# Patient Record
Sex: MALE | Race: WHITE | NOT HISPANIC OR LATINO | Employment: OTHER | URBAN - NONMETROPOLITAN AREA
[De-identification: names, ages, dates, MRNs, and addresses within clinical notes are randomized per-mention and may not be internally consistent; named-entity substitution may affect disease eponyms.]

---

## 2018-08-13 ENCOUNTER — OFFICE VISIT (OUTPATIENT)
Dept: UROLOGY | Facility: CLINIC | Age: 53
End: 2018-08-13

## 2018-08-13 VITALS
SYSTOLIC BLOOD PRESSURE: 132 MMHG | DIASTOLIC BLOOD PRESSURE: 62 MMHG | HEART RATE: 82 BPM | OXYGEN SATURATION: 98 % | RESPIRATION RATE: 16 BRPM

## 2018-08-13 DIAGNOSIS — N40.0 BENIGN PROSTATIC HYPERPLASIA WITHOUT LOWER URINARY TRACT SYMPTOMS: ICD-10-CM

## 2018-08-13 DIAGNOSIS — N43.40 SPERMATOCELE: Primary | ICD-10-CM

## 2018-08-13 DIAGNOSIS — N43.3 HYDROCELE IN ADULT: ICD-10-CM

## 2018-08-13 LAB
BILIRUB BLD-MCNC: NEGATIVE MG/DL
CLARITY, POC: CLEAR
COLOR UR: YELLOW
GLUCOSE UR STRIP-MCNC: NEGATIVE MG/DL
KETONES UR QL: NEGATIVE
LEUKOCYTE EST, POC: NEGATIVE
NITRITE UR-MCNC: NEGATIVE MG/ML
PH UR: 7.5 [PH] (ref 5–8)
PROT UR STRIP-MCNC: NEGATIVE MG/DL
PSA SERPL-MCNC: 2.07 NG/ML (ref 0.06–4)
RBC # UR STRIP: NEGATIVE /UL
SP GR UR: 1.01 (ref 1–1.03)
UROBILINOGEN UR QL: NORMAL

## 2018-08-13 PROCEDURE — 99203 OFFICE O/P NEW LOW 30 MIN: CPT | Performed by: UROLOGY

## 2018-08-13 PROCEDURE — 81003 URINALYSIS AUTO W/O SCOPE: CPT | Performed by: UROLOGY

## 2018-08-13 PROCEDURE — 55000 DRAINAGE OF HYDROCELE: CPT | Performed by: UROLOGY

## 2018-08-13 NOTE — PROGRESS NOTES
Chief Complaint  hydrocele/spermatocele? (follow up for exam, patient has not been seen for 3 years.)        MARISOL Mccall is a 53 y.o. male who returns today for the first time in several years with a past history of right-sided scrotal mass.  This is been periodically aspirated with production of white cloudy fluid consistent with spermatocele.  He continues to deny any difficulty voiding.  I found some old records where someone had put in family history of prostate cancer.  He denies that today for both breast and prostate.  He has any symptoms of sexual dysfunction or low testosterone level.  He has not had his prostate checked recently nor PSA.    Vitals:    08/13/18 0923   BP: 132/62   Pulse: 82   Resp: 16   SpO2: 98%       Past Medical History  Past Medical History:   Diagnosis Date   • GERD (gastroesophageal reflux disease)        Past Surgical History  History reviewed. No pertinent surgical history.    Medications  currently has no medications in their medication list.      Allergies  No Known Allergies    Social History  Social History     Social History Narrative   • No narrative on file       Family History  He has no family history of bladder or kidney cancer  He has no family history of kidney stones      AUA Symptom Score:      Review of Systems  Review of Systems   Constitutional: Negative.    Genitourinary: Negative.    All other systems reviewed and are negative.      Physical Exam  Physical Exam   Constitutional: He is oriented to person, place, and time. He appears well-developed and well-nourished.   HENT:   Head: Normocephalic and atraumatic.   Neck: Normal range of motion.   Pulmonary/Chest: Effort normal. No respiratory distress.   Abdominal: Soft. He exhibits no distension and no mass. There is no tenderness. No hernia. Hernia confirmed negative in the right inguinal area and confirmed negative in the left inguinal area.   Genitourinary: Rectum normal, prostate normal and penis normal.  Right testis shows mass.         Musculoskeletal: Normal range of motion.   Lymphadenopathy:     He has no cervical adenopathy.   Neurological: He is alert and oriented to person, place, and time.   Skin: Skin is warm and dry.   Psychiatric: He has a normal mood and affect. His behavior is normal.   Vitals reviewed.      Labs Recent and today in the office:  Results for orders placed or performed in visit on 08/13/18   POC Urinalysis Dipstick, Automated   Result Value Ref Range    Color Yellow Yellow, Straw, Dark Yellow, Marly    Clarity, UA Clear Clear    Specific Gravity  1.015 1.005 - 1.030    pH, Urine 7.5 5.0 - 8.0    Leukocytes Negative Negative    Nitrite, UA Negative Negative    Protein, POC Negative Negative mg/dL    Glucose, UA Negative Negative, 1000 mg/dL (3+) mg/dL    Ketones, UA Negative Negative    Urobilinogen, UA Normal Normal    Bilirubin Negative Negative    Blood, UA Negative Negative     The patient is placed in a supine position prepped and draped in a routine sterile fashion after shaving the scrotum and transilluminating the mass with fiberoptic light.  This identifies the position of the testicle within the hydrocele.  The skin and tunica sac were infiltrated with 1% plain Xylocaine and then an Angiocath needle is inserted through the anesthetized area into the hydrocele sac.  150  ml was aspirated from the mass and the testicle examined.  Antibiotic ointment was applied to the puncture site followed by a sterile compressive dressing.    Assessment & Plan  #1 nonnodular BPH digital rectal exam today is benign and a PSA is obtained.  If normal he can return on an annual basis.    #2 spermatocele: He understands the likelihood of recurrence but this is actually much smaller than it has been in the past.  He will therefore return on an annual basis and when necessary.

## 2019-01-25 ENCOUNTER — OFFICE VISIT (OUTPATIENT)
Dept: UROLOGY | Facility: CLINIC | Age: 54
End: 2019-01-25

## 2019-01-25 VITALS
SYSTOLIC BLOOD PRESSURE: 128 MMHG | TEMPERATURE: 98.2 F | DIASTOLIC BLOOD PRESSURE: 80 MMHG | OXYGEN SATURATION: 99 % | HEART RATE: 81 BPM

## 2019-01-25 DIAGNOSIS — N40.0 BENIGN PROSTATIC HYPERPLASIA, UNSPECIFIED WHETHER LOWER URINARY TRACT SYMPTOMS PRESENT: Primary | ICD-10-CM

## 2019-01-25 DIAGNOSIS — N43.40 SPERMATOCELE: ICD-10-CM

## 2019-01-25 LAB
BILIRUB BLD-MCNC: NEGATIVE MG/DL
CLARITY, POC: CLEAR
COLOR UR: YELLOW
GLUCOSE UR STRIP-MCNC: NEGATIVE MG/DL
KETONES UR QL: NEGATIVE
LEUKOCYTE EST, POC: NEGATIVE
NITRITE UR-MCNC: NEGATIVE MG/ML
PH UR: 8 [PH] (ref 5–8)
PROT UR STRIP-MCNC: NEGATIVE MG/DL
RBC # UR STRIP: NEGATIVE /UL
SP GR UR: 1.01 (ref 1–1.03)
UROBILINOGEN UR QL: NORMAL

## 2019-01-25 PROCEDURE — 55000 DRAINAGE OF HYDROCELE: CPT | Performed by: UROLOGY

## 2019-01-25 PROCEDURE — 81003 URINALYSIS AUTO W/O SCOPE: CPT | Performed by: UROLOGY

## 2019-01-25 NOTE — PROGRESS NOTES
Chief Complaint  Non Nodular Bph and Spermatocele        HPI  Cal is a 54 y.o. male who returns today requesting aspiration of his scrotal mass.  He states is not quite as big as last time but he is changing insurance companies and would like to proceed at this time.  He is informed there is a 1-5% chance of bleeding and infection each time we aspirate this spermatocele.  If he had surgical excision it would be more definite but still with a slight chance of recurrence unless he has an orchiectomy.  He is not interested in surgery    Vitals:    01/25/19 0837   BP: 128/80   Pulse: 81   Temp: 98.2 °F (36.8 °C)   SpO2: 99%       Past Medical History  Past Medical History:   Diagnosis Date   • GERD (gastroesophageal reflux disease)        Past Surgical History  No past surgical history on file.    Medications  currently has no medications in their medication list.      Allergies  No Known Allergies    Social History  Social History     Social History Narrative   • Not on file       Family History  He has no family history of bladder or kidney cancer  He has no family history of kidney stones      AUA Symptom Score:      Review of Systems  Review of Systems    Physical Exam  Physical Exam    Labs Recent and today in the office:  Results for orders placed or performed in visit on 01/25/19   POC Urinalysis Dipstick, Automated   Result Value Ref Range    Color Yellow Yellow, Straw, Dark Yellow, Marly    Clarity, UA Clear Clear    Specific Gravity  1.015 1.005 - 1.030    pH, Urine 8.0 5.0 - 8.0    Leukocytes Negative Negative    Nitrite, UA Negative Negative    Protein, POC Negative Negative mg/dL    Glucose, UA Negative Negative, 1000 mg/dL (3+) mg/dL    Ketones, UA Negative Negative    Urobilinogen, UA Normal Normal    Bilirubin Negative Negative    Blood, UA Negative Negative     The patient is placed in a supine position prepped and draped in a routine sterile fashion after shaving the scrotum and transilluminating  the mass with fiberoptic light.  This identifies the position of the testicle within the hydrocele.  The skin and tunica sac were infiltrated with 1% plain Xylocaine and then an Angiocath needle is inserted through our aspirated 160 mL of white: the anesthetized area into the spermatocele sac.  160 ml was aspirated from the mass and the testicle examined.  Antibiotic ointment was applied to the puncture site followed by a sterile compressive dressing.    Assessment & Plan  #1 spermatocele: Fluid aspirated 160 mL of

## 2019-08-15 ENCOUNTER — OFFICE VISIT (OUTPATIENT)
Dept: UROLOGY | Facility: CLINIC | Age: 54
End: 2019-08-15

## 2019-08-15 DIAGNOSIS — N40.0 BENIGN PROSTATIC HYPERPLASIA, UNSPECIFIED WHETHER LOWER URINARY TRACT SYMPTOMS PRESENT: Primary | ICD-10-CM

## 2019-08-15 DIAGNOSIS — N43.40 SPERMATOCELE: ICD-10-CM

## 2019-08-15 LAB
BILIRUB BLD-MCNC: NEGATIVE MG/DL
CLARITY, POC: CLEAR
COLOR UR: YELLOW
GLUCOSE UR STRIP-MCNC: NEGATIVE MG/DL
KETONES UR QL: NEGATIVE
LEUKOCYTE EST, POC: NEGATIVE
NITRITE UR-MCNC: NEGATIVE MG/ML
PH UR: 6.5 [PH] (ref 5–8)
PROT UR STRIP-MCNC: NEGATIVE MG/DL
RBC # UR STRIP: NEGATIVE /UL
SP GR UR: 1.01 (ref 1–1.03)
UROBILINOGEN UR QL: NORMAL

## 2019-08-15 PROCEDURE — 81003 URINALYSIS AUTO W/O SCOPE: CPT | Performed by: UROLOGY

## 2019-08-15 PROCEDURE — 55000 DRAINAGE OF HYDROCELE: CPT | Performed by: UROLOGY

## 2019-08-15 NOTE — PROGRESS NOTES
Chief Complaint  Benign Prostatic Hypertrophy and Spermatocele        HPI  Cal is a 54 y.o. male who returns today for his annual checkup with a known scrotal swelling secondary to spermatocele having benefited from several aspirations and requesting another today.  He reminds me his digital rectal exam was benign on previous visit and he declines today.  It has been a year since he had a PSA done.    There were no vitals filed for this visit.    Past Medical History  Past Medical History:   Diagnosis Date   • GERD (gastroesophageal reflux disease)        Past Surgical History  No past surgical history on file.    Medications  currently has no medications in their medication list.      Allergies  No Known Allergies    Social History  Social History     Social History Narrative   • Not on file       Family History  He has no family history of bladder or kidney cancer  He has no family history of kidney stones      AUA Symptom Score:      Review of Systems  Review of Systems   Constitutional: Negative for activity change, appetite change, chills, fatigue, fever, unexpected weight gain and unexpected weight loss.   Respiratory: Negative for apnea, cough, chest tightness, shortness of breath, wheezing and stridor.    Cardiovascular: Negative for chest pain, palpitations and leg swelling.   Gastrointestinal: Negative for abdominal distention, abdominal pain, anal bleeding, blood in stool, constipation, diarrhea, nausea, rectal pain, vomiting, GERD and indigestion.   Genitourinary: Negative for decreased libido, decreased urine volume, difficulty urinating, discharge, dysuria, flank pain, frequency, genital sores, hematuria, nocturia, penile pain, erectile dysfunction, penile swelling, scrotal swelling, testicular pain, urgency and urinary incontinence.   Musculoskeletal: Negative for back pain and joint swelling.   Neurological: Negative for tremors, seizures, speech difficulty, weakness and numbness.    Psychiatric/Behavioral: Negative for agitation, decreased concentration, sleep disturbance, depressed mood and stress. The patient is not nervous/anxious.        Physical Exam  Physical Exam   Constitutional: He is oriented to person, place, and time. He appears well-developed and well-nourished.   HENT:   Head: Normocephalic and atraumatic.   Neck: Normal range of motion.   Pulmonary/Chest: Effort normal. No respiratory distress.   Abdominal: Soft. He exhibits no distension and no mass. There is no tenderness. No hernia.   Genitourinary:         Musculoskeletal: Normal range of motion.   Lymphadenopathy:     He has no cervical adenopathy.   Neurological: He is alert and oriented to person, place, and time.   Skin: Skin is warm and dry.   Psychiatric: He has a normal mood and affect. His behavior is normal.   Vitals reviewed.      Labs Recent and today in the office:  Results for orders placed or performed in visit on 08/15/19   POC Urinalysis Dipstick, Automated   Result Value Ref Range    Color Yellow Yellow, Straw, Dark Yellow, Marly    Clarity, UA Clear Clear    Specific Gravity  1.015 1.005 - 1.030    pH, Urine 6.5 5.0 - 8.0    Leukocytes Negative Negative    Nitrite, UA Negative Negative    Protein, POC Negative Negative mg/dL    Glucose, UA Negative Negative, 1000 mg/dL (3+) mg/dL    Ketones, UA Negative Negative    Urobilinogen, UA Normal Normal    Bilirubin Negative Negative    Blood, UA Negative Negative     The patient is placed in a supine position prepped and draped in a routine sterile fashion after shaving the scrotum and transilluminating the mass with fiberoptic light.  This identifies the position of the testicle within the hydrocele.  The skin and tunica sac were infiltrated with 1% plain Xylocaine and then an Angiocath needle is inserted through the anesthetized area into the hydrocele sac.  150 ml was aspirated from the mass and the testicle examined.  Antibiotic ointment was applied to the  puncture site followed by a sterile compressive dressing.    Assessment & Plan  Scrotal mass: Known previous spermatocele aspirated today without difficulty    BPH: Currently voiding without difficulty.  Previous digital rectal exam is benign and a PSA today is submitted.  He is encouraged to eat a heart healthy diet and return on an annual basis.

## 2019-08-16 LAB — PSA SERPL-MCNC: 1.88 NG/ML (ref 0–4)

## 2020-07-06 ENCOUNTER — OFFICE VISIT (OUTPATIENT)
Dept: UROLOGY | Facility: CLINIC | Age: 55
End: 2020-07-06

## 2020-07-06 DIAGNOSIS — N43.40 SPERMATOCELE: Primary | ICD-10-CM

## 2020-07-06 PROCEDURE — 55000 DRAINAGE OF HYDROCELE: CPT | Performed by: UROLOGY

## 2020-07-06 NOTE — PROGRESS NOTES
Chief Complaint  Hydrocele Aspiration        HPI  Cal is a 55 y.o. male who returns today for annual visit basically requesting aspiration of his scrotal mass which is a scrotal hydrocele or spermatocele.  He remains asymptomatic in regards to his prostate and had a PSA 1.810 months ago.    There were no vitals filed for this visit.    Past Medical History  Past Medical History:   Diagnosis Date   • GERD (gastroesophageal reflux disease)        Past Surgical History  No past surgical history on file.    Medications  currently has no medications in their medication list.      Allergies  No Known Allergies    Social History  Social History     Socioeconomic History   • Marital status:      Spouse name: Not on file   • Number of children: Not on file   • Years of education: Not on file   • Highest education level: Not on file   Tobacco Use   • Smoking status: Never Smoker   • Smokeless tobacco: Never Used   Substance and Sexual Activity   • Alcohol use: Yes   • Drug use: No   • Sexual activity: Yes       Family History  He has no family history of bladder or kidney cancer  He has no family history of kidney stones      AUA Symptom Score:      Review of Systems  Review of Systems   Constitutional: Negative for activity change, appetite change, chills, fatigue, fever, unexpected weight gain and unexpected weight loss.   Respiratory: Negative for apnea, cough, chest tightness, shortness of breath, wheezing and stridor.    Cardiovascular: Negative for chest pain, palpitations and leg swelling.   Gastrointestinal: Negative for abdominal distention, abdominal pain, anal bleeding, blood in stool, constipation, diarrhea, nausea, rectal pain, vomiting, GERD and indigestion.   Genitourinary: Negative for decreased libido, decreased urine volume, difficulty urinating, discharge, dysuria, flank pain, frequency, genital sores, hematuria, nocturia, penile pain, erectile dysfunction, penile swelling, scrotal swelling,  testicular pain, urgency and urinary incontinence.   Musculoskeletal: Negative for back pain and joint swelling.   Neurological: Negative for tremors, seizures, speech difficulty, weakness and numbness.   Psychiatric/Behavioral: Negative for agitation, decreased concentration, sleep disturbance, depressed mood and stress. The patient is not nervous/anxious.        Physical Exam  Physical Exam   Constitutional: He is oriented to person, place, and time. He appears well-developed and well-nourished.   HENT:   Head: Normocephalic and atraumatic.   Neck: Normal range of motion.   Pulmonary/Chest: Effort normal. No respiratory distress.   Abdominal: Soft. He exhibits no distension and no mass. There is no tenderness. No hernia.   Genitourinary: Rectum normal and prostate normal.         Musculoskeletal: Normal range of motion.   Lymphadenopathy:     He has no cervical adenopathy.   Neurological: He is alert and oriented to person, place, and time.   Skin: Skin is warm and dry.   Psychiatric: He has a normal mood and affect. His behavior is normal.   Vitals reviewed.      Labs Recent and today in the office:  Results for orders placed or performed in visit on 08/15/19   PSA Screen   Result Value Ref Range    PSA 1.880 0.000 - 4.000 ng/mL   POC Urinalysis Dipstick, Automated   Result Value Ref Range    Color Yellow Yellow, Straw, Dark Yellow, Marly    Clarity, UA Clear Clear    Specific Gravity  1.015 1.005 - 1.030    pH, Urine 6.5 5.0 - 8.0    Leukocytes Negative Negative    Nitrite, UA Negative Negative    Protein, POC Negative Negative mg/dL    Glucose, UA Negative Negative, 1000 mg/dL (3+) mg/dL    Ketones, UA Negative Negative    Urobilinogen, UA Normal Normal    Bilirubin Negative Negative    Blood, UA Negative Negative     The patient is placed in a supine position prepped and draped in a routine sterile fashion after shaving the scrotum and transilluminating the mass with fiberoptic light.  This identifies the  position of the testicle within the hydrocele.  The skin and tunica sac were infiltrated with 1% plain Xylocaine and then an Angiocath needle is inserted through the anesthetized area into the hydrocele sac.  150 ml was aspirated from the mass and the testicle examined.  Antibiotic ointment was applied to the puncture site followed by a sterile compressive dressing.    Assessment & Plan  BPH: Digital rectal exam today is benign and a PSA this year was 1.89.    Scrotal mass/spermatocele: 150 mL of cloudy fluid consistent with spermatocele was aspirated.  Patient is informed of the option of surgical procedure which would produce a more long-lasting result.  He return in 1 year or as needed.

## 2021-04-14 ENCOUNTER — PROCEDURE VISIT (OUTPATIENT)
Dept: UROLOGY | Facility: CLINIC | Age: 56
End: 2021-04-14

## 2021-04-14 VITALS
HEART RATE: 82 BPM | RESPIRATION RATE: 16 BRPM | SYSTOLIC BLOOD PRESSURE: 134 MMHG | WEIGHT: 180 LBS | DIASTOLIC BLOOD PRESSURE: 68 MMHG | HEIGHT: 71 IN | TEMPERATURE: 97.7 F | OXYGEN SATURATION: 96 % | BODY MASS INDEX: 25.2 KG/M2

## 2021-04-14 DIAGNOSIS — N43.40 SPERMATOCELE: Primary | ICD-10-CM

## 2021-04-14 DIAGNOSIS — N40.0 BENIGN PROSTATIC HYPERPLASIA, UNSPECIFIED WHETHER LOWER URINARY TRACT SYMPTOMS PRESENT: ICD-10-CM

## 2021-04-14 DIAGNOSIS — N40.0 BENIGN PROSTATIC HYPERPLASIA, UNSPECIFIED WHETHER LOWER URINARY TRACT SYMPTOMS PRESENT: Primary | ICD-10-CM

## 2021-04-14 PROCEDURE — 55000 DRAINAGE OF HYDROCELE: CPT | Performed by: UROLOGY

## 2021-04-14 NOTE — PROGRESS NOTES
Chief Complaint  Hydrocele Aspiration        HPI  Cal is a 56 y.o. male who comes today requesting his right hydrocele be aspirated as he has on a number of occasions with good results.  Is also due for an annual screening on his prostate.  He denies any difficulty voiding and has a benign prostate on digital rectal exam today and a PSA is submitted.  He can return on an annual basis and as needed to see Dr. Dominguez for hydrocele aspiration.    Vitals:    04/14/21 1515   BP: 134/68   Pulse: 82   Resp: 16   Temp: 97.7 °F (36.5 °C)   SpO2: 96%       Past Medical History  Past Medical History:   Diagnosis Date   • GERD (gastroesophageal reflux disease)        Past Surgical History  No past surgical history on file.    Medications  currently has no medications in their medication list.      Allergies  No Known Allergies    Social History  Social History     Socioeconomic History   • Marital status:      Spouse name: Not on file   • Number of children: Not on file   • Years of education: Not on file   • Highest education level: Not on file   Tobacco Use   • Smoking status: Never Smoker   • Smokeless tobacco: Never Used   Substance and Sexual Activity   • Alcohol use: Yes   • Drug use: No   • Sexual activity: Yes       Family History  He has no family history of bladder or kidney cancer  He has no family history of kidney stones      AUA Symptom Score:      Review of Systems  Review of Systems   Constitutional: Negative for activity change, appetite change, chills, fatigue, fever, unexpected weight gain and unexpected weight loss.   Respiratory: Negative for apnea, cough, chest tightness, shortness of breath, wheezing and stridor.    Cardiovascular: Negative for chest pain, palpitations and leg swelling.   Gastrointestinal: Negative for abdominal distention, abdominal pain, anal bleeding, blood in stool, constipation, diarrhea, nausea, rectal pain, vomiting, GERD and indigestion.   Genitourinary: Negative for  decreased libido, decreased urine volume, difficulty urinating, discharge, dysuria, flank pain, frequency, genital sores, hematuria, nocturia, penile pain, erectile dysfunction, penile swelling, scrotal swelling, testicular pain, urgency and urinary incontinence.   Musculoskeletal: Negative for back pain and joint swelling.   Neurological: Negative for tremors, seizures, speech difficulty, weakness and numbness.   Psychiatric/Behavioral: Negative for agitation, decreased concentration, sleep disturbance, depressed mood and stress. The patient is not nervous/anxious.        Physical Exam  Physical Exam  Vitals reviewed.   Constitutional:       Appearance: He is well-developed.   HENT:      Head: Normocephalic and atraumatic.   Pulmonary:      Effort: Pulmonary effort is normal. No respiratory distress.   Abdominal:      General: There is no distension.      Palpations: Abdomen is soft. There is no mass.      Tenderness: There is no abdominal tenderness.      Hernia: No hernia is present.   Genitourinary:     Prostate: Normal.      Rectum: Normal.   Musculoskeletal:         General: Normal range of motion.      Cervical back: Normal range of motion.   Lymphadenopathy:      Cervical: No cervical adenopathy.   Skin:     General: Skin is warm and dry.   Neurological:      Mental Status: He is alert and oriented to person, place, and time.   Psychiatric:         Behavior: Behavior normal.         Labs Recent and today in the office:  Results for orders placed or performed in visit on 08/15/19   PSA Screen    Specimen: Blood   Result Value Ref Range    PSA 1.880 0.000 - 4.000 ng/mL   POC Urinalysis Dipstick, Automated    Specimen: Urine   Result Value Ref Range    Color Yellow Yellow, Straw, Dark Yellow, Marly    Clarity, UA Clear Clear    Specific Gravity  1.015 1.005 - 1.030    pH, Urine 6.5 5.0 - 8.0    Leukocytes Negative Negative    Nitrite, UA Negative Negative    Protein, POC Negative Negative mg/dL    Glucose, UA  Negative Negative, 1000 mg/dL (3+) mg/dL    Ketones, UA Negative Negative    Urobilinogen, UA Normal Normal    Bilirubin Negative Negative    Blood, UA Negative Negative     The patient is placed in a supine position prepped and draped in a routine sterile fashion after shaving the scrotum and transilluminating the mass with fiberoptic light.  This identifies the position of the testicle within the hydrocele.  The skin and tunica sac were infiltrated with 1% plain Xylocaine and then an Angiocath needle is inserted through the anesthetized area into the hydrocele sac.  180 ml was aspirated from the mass and the testicle examined.  Antibiotic ointment was applied to the puncture site followed by a sterile compressive dressing.    Assessment & Plan  Hydrocele: 180 mL of fluid are aspirated from the right scrotal compartment without difficulty.    BPH: Digital rectal exam is benign and a PSA submitted.  He can return on an annual basis and as needed.

## 2021-04-15 LAB — PSA SERPL-MCNC: 2.27 NG/ML (ref 0–4)

## 2022-03-14 ENCOUNTER — OFFICE VISIT (OUTPATIENT)
Dept: UROLOGY | Facility: CLINIC | Age: 57
End: 2022-03-14

## 2022-03-14 VITALS
BODY MASS INDEX: 25.2 KG/M2 | HEIGHT: 71 IN | DIASTOLIC BLOOD PRESSURE: 76 MMHG | TEMPERATURE: 98.4 F | HEART RATE: 83 BPM | OXYGEN SATURATION: 98 % | WEIGHT: 180 LBS | SYSTOLIC BLOOD PRESSURE: 120 MMHG

## 2022-03-14 DIAGNOSIS — N43.3 HYDROCELE, UNSPECIFIED HYDROCELE TYPE: Primary | ICD-10-CM

## 2022-03-14 PROCEDURE — 55000 DRAINAGE OF HYDROCELE: CPT | Performed by: UROLOGY

## 2022-03-14 NOTE — PROGRESS NOTES
"Chief Complaint   Patient presents with   • Follow-up     Spermatocele         HPI  Mr. Mccall is a 57 y.o. male with history of hydrocele who presents for follow up.     At this visit patient states that hydroceles reaccumulating    Past Medical History:   Diagnosis Date   • GERD (gastroesophageal reflux disease)        History reviewed. No pertinent surgical history.    No current outpatient medications on file.     Physical Exam  Visit Vitals  /76   Pulse 83   Temp 98.4 °F (36.9 °C)   Ht 180.3 cm (71\")   Wt 81.6 kg (180 lb)   SpO2 98%   BMI 25.10 kg/m²       Labs  Brief Urine Lab Results     None          No results found for: GLUCOSE, CALCIUM, NA, K, CO2, CL, BUN, CREATININE, EGFRIFAFRI, EGFRIFNONA, BCR, ANIONGAP    No results found for: WBC, HGB, HCT, MCV, PLT         Lab Results   Component Value Date    PSA 2.270 04/14/2021    PSA 1.880 08/15/2019    PSA 2.070 08/13/2018       No results found for: TESTOSTERONE         Radiographic Studies  No Images in the past 120 days found..      I have reviewed above labs and imaging.     Assessment  57 y.o. male with recurrent chronic hydrocele    Patient was prepped and draped in usual sterile fashion.  Formal timeout was performed.  We inserted a large 18-gauge needle and aspirated the hydrocele after the skin was anesthetized with lidocaine.  A large amount of clear yellow fluid was removed.    Plan  1.  I performed an office drainage today.   2.  Patient will follow up in a year for repeat evaluation and drainage per his preference  "

## 2022-07-07 ENCOUNTER — LAB (OUTPATIENT)
Dept: LAB | Facility: HOSPITAL | Age: 57
End: 2022-07-07

## 2022-07-07 ENCOUNTER — TELEPHONE (OUTPATIENT)
Dept: NEUROLOGY | Facility: CLINIC | Age: 57
End: 2022-07-07

## 2022-07-07 DIAGNOSIS — R53.83 OTHER FATIGUE: ICD-10-CM

## 2022-07-07 DIAGNOSIS — R06.00 DYSPNEA, UNSPECIFIED TYPE: Primary | ICD-10-CM

## 2022-07-07 DIAGNOSIS — R06.00 DYSPNEA, UNSPECIFIED TYPE: ICD-10-CM

## 2022-07-07 LAB
ALBUMIN SERPL-MCNC: 4.1 G/DL (ref 3.5–5.2)
ALBUMIN/GLOB SERPL: 1.6 G/DL
ALP SERPL-CCNC: 53 U/L (ref 39–117)
ALT SERPL W P-5'-P-CCNC: 16 U/L (ref 1–41)
ANION GAP SERPL CALCULATED.3IONS-SCNC: 6.5 MMOL/L (ref 5–15)
AST SERPL-CCNC: 16 U/L (ref 1–40)
BILIRUB SERPL-MCNC: 0.2 MG/DL (ref 0–1.2)
BUN SERPL-MCNC: 15 MG/DL (ref 6–20)
BUN/CREAT SERPL: 13.8 (ref 7–25)
CALCIUM SPEC-SCNC: 8.6 MG/DL (ref 8.6–10.5)
CHLORIDE SERPL-SCNC: 106 MMOL/L (ref 98–107)
CO2 SERPL-SCNC: 25.5 MMOL/L (ref 22–29)
CREAT SERPL-MCNC: 1.09 MG/DL (ref 0.76–1.27)
EGFRCR SERPLBLD CKD-EPI 2021: 79.7 ML/MIN/1.73
GLOBULIN UR ELPH-MCNC: 2.6 GM/DL
GLUCOSE SERPL-MCNC: 143 MG/DL (ref 65–99)
IRON 24H UR-MRATE: 8 MCG/DL (ref 59–158)
IRON SATN MFR SERPL: 2 % (ref 20–50)
POTASSIUM SERPL-SCNC: 4.1 MMOL/L (ref 3.5–5.2)
PROT SERPL-MCNC: 6.7 G/DL (ref 6–8.5)
SODIUM SERPL-SCNC: 138 MMOL/L (ref 136–145)
TIBC SERPL-MCNC: 471 MCG/DL (ref 298–536)
TRANSFERRIN SERPL-MCNC: 316 MG/DL (ref 200–360)

## 2022-07-07 PROCEDURE — 83540 ASSAY OF IRON: CPT

## 2022-07-07 PROCEDURE — 84466 ASSAY OF TRANSFERRIN: CPT

## 2022-07-07 PROCEDURE — 80050 GENERAL HEALTH PANEL: CPT

## 2022-07-07 PROCEDURE — 85007 BL SMEAR W/DIFF WBC COUNT: CPT

## 2022-07-07 PROCEDURE — 36415 COLL VENOUS BLD VENIPUNCTURE: CPT

## 2022-07-07 NOTE — TELEPHONE ENCOUNTER
Franyk does not currently does not have a PCP but is trying to secure an appointment with one in the area. He is having shortness of breath, which is abnormal for him, and fatigue, which is also abnormal for him. He is normally very physically active and noticed these changes a few weeks ago. He denies any chest pain or severe shortness of breath. His wife told him his skin is a little yellow. He denies chronic or regular alcohol use. He denies any history of liver problems. Will order CMP, iron profile, CBC with differential, and TSH for further evaluation.

## 2022-07-08 ENCOUNTER — APPOINTMENT (OUTPATIENT)
Dept: CT IMAGING | Facility: HOSPITAL | Age: 57
End: 2022-07-08

## 2022-07-08 ENCOUNTER — HOSPITAL ENCOUNTER (OUTPATIENT)
Facility: HOSPITAL | Age: 57
Discharge: HOME | End: 2022-07-09
Attending: EMERGENCY MEDICINE | Admitting: NURSE PRACTITIONER

## 2022-07-08 DIAGNOSIS — D50.0 IRON DEFICIENCY ANEMIA DUE TO CHRONIC BLOOD LOSS: Primary | ICD-10-CM

## 2022-07-08 DIAGNOSIS — D62 ANEMIA DUE TO BLOOD LOSS, ACUTE: ICD-10-CM

## 2022-07-08 DIAGNOSIS — D50.9 IRON DEFICIENCY ANEMIA, UNSPECIFIED IRON DEFICIENCY ANEMIA TYPE: ICD-10-CM

## 2022-07-08 DIAGNOSIS — K92.1 MELENA: ICD-10-CM

## 2022-07-08 LAB
ABO GROUP BLD: NORMAL
ABO GROUP BLD: NORMAL
ALBUMIN SERPL-MCNC: 4.1 G/DL (ref 3.5–5.2)
ALBUMIN/GLOB SERPL: 1.6 G/DL
ALP SERPL-CCNC: 54 U/L (ref 39–117)
ALT SERPL W P-5'-P-CCNC: 14 U/L (ref 1–41)
ANION GAP SERPL CALCULATED.3IONS-SCNC: 12.4 MMOL/L (ref 5–15)
ANISOCYTOSIS BLD QL: ABNORMAL
ANISOCYTOSIS BLD QL: NORMAL
APTT PPP: 27 SECONDS (ref 70–100)
AST SERPL-CCNC: 13 U/L (ref 1–40)
BACTERIA UR QL AUTO: ABNORMAL /HPF
BASOPHILS # BLD AUTO: 0.04 10*3/MM3 (ref 0–0.2)
BASOPHILS # BLD MANUAL: 0.06 10*3/MM3 (ref 0–0.2)
BASOPHILS NFR BLD AUTO: 0.6 % (ref 0–1.5)
BASOPHILS NFR BLD MANUAL: 1 % (ref 0–1.5)
BILIRUB SERPL-MCNC: 0.3 MG/DL (ref 0–1.2)
BILIRUB UR QL STRIP: NEGATIVE
BLD GP AB SCN SERPL QL: NEGATIVE
BUN SERPL-MCNC: 19 MG/DL (ref 6–20)
BUN/CREAT SERPL: 17.8 (ref 7–25)
CALCIUM SPEC-SCNC: 8.8 MG/DL (ref 8.6–10.5)
CHLORIDE SERPL-SCNC: 105 MMOL/L (ref 98–107)
CK SERPL-CCNC: 68 U/L (ref 20–200)
CLARITY UR: CLEAR
CO2 SERPL-SCNC: 19.6 MMOL/L (ref 22–29)
COLOR UR: YELLOW
CREAT SERPL-MCNC: 1.07 MG/DL (ref 0.76–1.27)
DEPRECATED RDW RBC AUTO: 38.8 FL (ref 37–54)
DEPRECATED RDW RBC AUTO: 44.1 FL (ref 37–54)
EGFRCR SERPLBLD CKD-EPI 2021: 81.4 ML/MIN/1.73
ELLIPTOCYTES BLD QL SMEAR: NORMAL
EOSINOPHIL # BLD AUTO: 0.07 10*3/MM3 (ref 0–0.4)
EOSINOPHIL NFR BLD AUTO: 1 % (ref 0.3–6.2)
ERYTHROCYTE [DISTWIDTH] IN BLOOD BY AUTOMATED COUNT: 15.2 % (ref 12.3–15.4)
ERYTHROCYTE [DISTWIDTH] IN BLOOD BY AUTOMATED COUNT: 17.2 % (ref 12.3–15.4)
GLOBULIN UR ELPH-MCNC: 2.6 GM/DL
GLUCOSE SERPL-MCNC: 101 MG/DL (ref 65–99)
GLUCOSE UR STRIP-MCNC: NEGATIVE MG/DL
HCT VFR BLD AUTO: 20.4 % (ref 37.5–51)
HCT VFR BLD AUTO: 21 % (ref 37.5–51)
HEMOCCULT STL QL: POSITIVE
HGB BLD-MCNC: 5.6 G/DL (ref 13–17.7)
HGB BLD-MCNC: 5.7 G/DL (ref 13–17.7)
HGB UR QL STRIP.AUTO: NEGATIVE
HYALINE CASTS UR QL AUTO: ABNORMAL /LPF
HYPOCHROMIA BLD QL: ABNORMAL
HYPOCHROMIA BLD QL: NORMAL
IMM GRANULOCYTES # BLD AUTO: 0.04 10*3/MM3 (ref 0–0.05)
IMM GRANULOCYTES NFR BLD AUTO: 0.6 % (ref 0–0.5)
INR PPP: 1.09 (ref 0.9–1.1)
IRON 24H UR-MRATE: 9 MCG/DL (ref 59–158)
IRON SATN MFR SERPL: 2 % (ref 20–50)
KETONES UR QL STRIP: NEGATIVE
LEUKOCYTE ESTERASE UR QL STRIP.AUTO: ABNORMAL
LYMPHOCYTES # BLD AUTO: 1.05 10*3/MM3 (ref 0.7–3.1)
LYMPHOCYTES # BLD MANUAL: 1.17 10*3/MM3 (ref 0.7–3.1)
LYMPHOCYTES NFR BLD AUTO: 15.5 % (ref 19.6–45.3)
LYMPHOCYTES NFR BLD MANUAL: 4.2 % (ref 5–12)
MAGNESIUM SERPL-MCNC: 2.1 MG/DL (ref 1.6–2.6)
MCH RBC QN AUTO: 19.5 PG (ref 26.6–33)
MCH RBC QN AUTO: 19.6 PG (ref 26.6–33)
MCHC RBC AUTO-ENTMCNC: 27.1 G/DL (ref 31.5–35.7)
MCHC RBC AUTO-ENTMCNC: 27.5 G/DL (ref 31.5–35.7)
MCV RBC AUTO: 71.6 FL (ref 79–97)
MCV RBC AUTO: 71.7 FL (ref 79–97)
MICROCYTES BLD QL: ABNORMAL
MONOCYTES # BLD AUTO: 0.48 10*3/MM3 (ref 0.1–0.9)
MONOCYTES # BLD: 0.26 10*3/MM3 (ref 0.1–0.9)
MONOCYTES NFR BLD AUTO: 7.1 % (ref 5–12)
NEUTROPHILS # BLD AUTO: 4.71 10*3/MM3 (ref 1.7–7)
NEUTROPHILS NFR BLD AUTO: 5.1 10*3/MM3 (ref 1.7–7)
NEUTROPHILS NFR BLD AUTO: 75.2 % (ref 42.7–76)
NEUTROPHILS NFR BLD MANUAL: 76 % (ref 42.7–76)
NITRITE UR QL STRIP: NEGATIVE
NRBC BLD AUTO-RTO: 0 /100 WBC (ref 0–0.2)
PH UR STRIP.AUTO: <=5 [PH] (ref 5–8)
PHOSPHATE SERPL-MCNC: 3.2 MG/DL (ref 2.5–4.5)
PLAT MORPH BLD: NORMAL
PLATELET # BLD AUTO: 375 10*3/MM3 (ref 140–450)
PLATELET # BLD AUTO: 397 10*3/MM3 (ref 140–450)
PMV BLD AUTO: 10.7 FL (ref 6–12)
PMV BLD AUTO: 9.9 FL (ref 6–12)
POTASSIUM SERPL-SCNC: 4.3 MMOL/L (ref 3.5–5.2)
PROT SERPL-MCNC: 6.7 G/DL (ref 6–8.5)
PROT UR QL STRIP: NEGATIVE
PROTHROMBIN TIME: 14.4 SECONDS (ref 12.5–14.5)
RBC # BLD AUTO: 2.85 10*6/MM3 (ref 4.14–5.8)
RBC # BLD AUTO: 2.93 10*6/MM3 (ref 4.14–5.8)
RBC # UR STRIP: ABNORMAL /HPF
REF LAB TEST METHOD: ABNORMAL
RH BLD: POSITIVE
RH BLD: POSITIVE
SARS-COV-2 RNA PNL SPEC NAA+PROBE: NOT DETECTED
SMALL PLATELETS BLD QL SMEAR: ADEQUATE
SODIUM SERPL-SCNC: 137 MMOL/L (ref 136–145)
SP GR UR STRIP: 1.01 (ref 1–1.03)
SQUAMOUS #/AREA URNS HPF: ABNORMAL /HPF
T&S EXPIRATION DATE: NORMAL
T4 FREE SERPL-MCNC: 1.18 NG/DL (ref 0.93–1.7)
TIBC SERPL-MCNC: 501 MCG/DL (ref 298–536)
TRANSFERRIN SERPL-MCNC: 336 MG/DL (ref 200–360)
TSH SERPL DL<=0.05 MIU/L-ACNC: 2.75 UIU/ML (ref 0.27–4.2)
TSH SERPL DL<=0.05 MIU/L-ACNC: 3.35 UIU/ML (ref 0.27–4.2)
UROBILINOGEN UR QL STRIP: ABNORMAL
VARIANT LYMPHS NFR BLD MANUAL: 18.8 % (ref 19.6–45.3)
WBC # UR STRIP: ABNORMAL /HPF
WBC MORPH BLD: NORMAL
WBC MORPH BLD: NORMAL
WBC NRBC COR # BLD: 6.2 10*3/MM3 (ref 3.4–10.8)
WBC NRBC COR # BLD: 6.78 10*3/MM3 (ref 3.4–10.8)

## 2022-07-08 PROCEDURE — 85610 PROTHROMBIN TIME: CPT | Performed by: NURSE PRACTITIONER

## 2022-07-08 PROCEDURE — 82272 OCCULT BLD FECES 1-3 TESTS: CPT | Performed by: NURSE PRACTITIONER

## 2022-07-08 PROCEDURE — 96375 TX/PRO/DX INJ NEW DRUG ADDON: CPT

## 2022-07-08 PROCEDURE — G0378 HOSPITAL OBSERVATION PER HR: HCPCS

## 2022-07-08 PROCEDURE — 83735 ASSAY OF MAGNESIUM: CPT | Performed by: NURSE PRACTITIONER

## 2022-07-08 PROCEDURE — 99219 PR INITIAL OBSERVATION CARE/DAY 50 MINUTES: CPT | Performed by: NURSE PRACTITIONER

## 2022-07-08 PROCEDURE — 81001 URINALYSIS AUTO W/SCOPE: CPT | Performed by: NURSE PRACTITIONER

## 2022-07-08 PROCEDURE — 36430 TRANSFUSION BLD/BLD COMPNT: CPT

## 2022-07-08 PROCEDURE — 86900 BLOOD TYPING SEROLOGIC ABO: CPT

## 2022-07-08 PROCEDURE — 99284 EMERGENCY DEPT VISIT MOD MDM: CPT

## 2022-07-08 PROCEDURE — 99222 1ST HOSP IP/OBS MODERATE 55: CPT | Performed by: INTERNAL MEDICINE

## 2022-07-08 PROCEDURE — 25010000002 IOPAMIDOL 61 % SOLUTION: Performed by: EMERGENCY MEDICINE

## 2022-07-08 PROCEDURE — 84466 ASSAY OF TRANSFERRIN: CPT | Performed by: NURSE PRACTITIONER

## 2022-07-08 PROCEDURE — 86850 RBC ANTIBODY SCREEN: CPT | Performed by: NURSE PRACTITIONER

## 2022-07-08 PROCEDURE — 84439 ASSAY OF FREE THYROXINE: CPT | Performed by: NURSE PRACTITIONER

## 2022-07-08 PROCEDURE — 86900 BLOOD TYPING SEROLOGIC ABO: CPT | Performed by: NURSE PRACTITIONER

## 2022-07-08 PROCEDURE — P9016 RBC LEUKOCYTES REDUCED: HCPCS

## 2022-07-08 PROCEDURE — 36415 COLL VENOUS BLD VENIPUNCTURE: CPT

## 2022-07-08 PROCEDURE — 87635 SARS-COV-2 COVID-19 AMP PRB: CPT | Performed by: INTERNAL MEDICINE

## 2022-07-08 PROCEDURE — 86920 COMPATIBILITY TEST SPIN: CPT

## 2022-07-08 PROCEDURE — 74177 CT ABD & PELVIS W/CONTRAST: CPT

## 2022-07-08 PROCEDURE — 71275 CT ANGIOGRAPHY CHEST: CPT

## 2022-07-08 PROCEDURE — 86901 BLOOD TYPING SEROLOGIC RH(D): CPT | Performed by: NURSE PRACTITIONER

## 2022-07-08 PROCEDURE — 80050 GENERAL HEALTH PANEL: CPT | Performed by: NURSE PRACTITIONER

## 2022-07-08 PROCEDURE — 85007 BL SMEAR W/DIFF WBC COUNT: CPT | Performed by: NURSE PRACTITIONER

## 2022-07-08 PROCEDURE — 96376 TX/PRO/DX INJ SAME DRUG ADON: CPT

## 2022-07-08 PROCEDURE — 86901 BLOOD TYPING SEROLOGIC RH(D): CPT

## 2022-07-08 PROCEDURE — P9612 CATHETERIZE FOR URINE SPEC: HCPCS

## 2022-07-08 PROCEDURE — 84100 ASSAY OF PHOSPHORUS: CPT | Performed by: NURSE PRACTITIONER

## 2022-07-08 PROCEDURE — 83540 ASSAY OF IRON: CPT | Performed by: NURSE PRACTITIONER

## 2022-07-08 PROCEDURE — 96367 TX/PROPH/DG ADDL SEQ IV INF: CPT

## 2022-07-08 PROCEDURE — 96365 THER/PROPH/DIAG IV INF INIT: CPT

## 2022-07-08 PROCEDURE — 25010000002 NA FERRIC GLUC CPLX PER 12.5 MG: Performed by: NURSE PRACTITIONER

## 2022-07-08 PROCEDURE — 82550 ASSAY OF CK (CPK): CPT | Performed by: NURSE PRACTITIONER

## 2022-07-08 PROCEDURE — C9803 HOPD COVID-19 SPEC COLLECT: HCPCS

## 2022-07-08 PROCEDURE — 85730 THROMBOPLASTIN TIME PARTIAL: CPT | Performed by: NURSE PRACTITIONER

## 2022-07-08 PROCEDURE — 96366 THER/PROPH/DIAG IV INF ADDON: CPT

## 2022-07-08 RX ORDER — POLYETHYLENE GLYCOL 3350, SODIUM CHLORIDE, POTASSIUM CHLORIDE, SODIUM BICARBONATE, AND SODIUM SULFATE 240; 5.84; 2.98; 6.72; 22.72 G/4L; G/4L; G/4L; G/4L; G/4L
4000 POWDER, FOR SOLUTION ORAL SEE ADMIN INSTRUCTIONS
Status: DISCONTINUED | OUTPATIENT
Start: 2022-07-08 | End: 2022-07-09 | Stop reason: HOSPADM

## 2022-07-08 RX ORDER — SODIUM CHLORIDE 0.9 % (FLUSH) 0.9 %
10 SYRINGE (ML) INJECTION EVERY 12 HOURS SCHEDULED
Status: DISCONTINUED | OUTPATIENT
Start: 2022-07-08 | End: 2022-07-09 | Stop reason: HOSPADM

## 2022-07-08 RX ORDER — ONDANSETRON 2 MG/ML
4 INJECTION INTRAMUSCULAR; INTRAVENOUS EVERY 6 HOURS PRN
Status: DISCONTINUED | OUTPATIENT
Start: 2022-07-08 | End: 2022-07-09 | Stop reason: HOSPADM

## 2022-07-08 RX ORDER — SODIUM CHLORIDE 9 MG/ML
70 INJECTION, SOLUTION INTRAVENOUS CONTINUOUS PRN
Status: DISCONTINUED | OUTPATIENT
Start: 2022-07-08 | End: 2022-07-09 | Stop reason: HOSPADM

## 2022-07-08 RX ORDER — SODIUM CHLORIDE 0.9 % (FLUSH) 0.9 %
10 SYRINGE (ML) INJECTION AS NEEDED
Status: DISCONTINUED | OUTPATIENT
Start: 2022-07-08 | End: 2022-07-09 | Stop reason: HOSPADM

## 2022-07-08 RX ORDER — ACETAMINOPHEN 325 MG/1
650 TABLET ORAL EVERY 4 HOURS PRN
Status: DISCONTINUED | OUTPATIENT
Start: 2022-07-08 | End: 2022-07-09 | Stop reason: HOSPADM

## 2022-07-08 RX ORDER — PANTOPRAZOLE SODIUM 40 MG/10ML
80 INJECTION, POWDER, LYOPHILIZED, FOR SOLUTION INTRAVENOUS ONCE
Status: DISCONTINUED | OUTPATIENT
Start: 2022-07-08 | End: 2022-07-08

## 2022-07-08 RX ORDER — PANTOPRAZOLE SODIUM 40 MG/10ML
40 INJECTION, POWDER, LYOPHILIZED, FOR SOLUTION INTRAVENOUS EVERY 12 HOURS SCHEDULED
Status: DISCONTINUED | OUTPATIENT
Start: 2022-07-08 | End: 2022-07-08 | Stop reason: SDUPTHER

## 2022-07-08 RX ADMIN — SODIUM CHLORIDE 250 MG: 9 INJECTION, SOLUTION INTRAVENOUS at 12:18

## 2022-07-08 RX ADMIN — PANTOPRAZOLE SODIUM 8 MG/HR: 40 INJECTION, POWDER, FOR SOLUTION INTRAVENOUS at 19:30

## 2022-07-08 RX ADMIN — SODIUM CHLORIDE 1000 ML: 9 INJECTION, SOLUTION INTRAVENOUS at 11:38

## 2022-07-08 RX ADMIN — POLYETHYLENE GLYCOL-3350 AND ELECTROLYTES WITH FLAVOR PACK 4000 ML: 240; 5.84; 2.98; 6.72; 22.72 POWDER, FOR SOLUTION ORAL at 17:20

## 2022-07-08 RX ADMIN — PANTOPRAZOLE SODIUM 8 MG/HR: 40 INJECTION, POWDER, FOR SOLUTION INTRAVENOUS at 15:04

## 2022-07-08 RX ADMIN — PANTOPRAZOLE SODIUM 40 MG: 40 INJECTION, POWDER, FOR SOLUTION INTRAVENOUS at 13:39

## 2022-07-08 RX ADMIN — IOPAMIDOL 100 ML: 612 INJECTION, SOLUTION INTRAVENOUS at 12:07

## 2022-07-08 RX ADMIN — Medication 10 ML: at 15:05

## 2022-07-08 RX ADMIN — Medication 10 ML: at 21:39

## 2022-07-08 NOTE — PLAN OF CARE
Goal Outcome Evaluation:  Plan of Care Reviewed With: patient Admitted for anemia and workup, labs being monitored, PRBC infusing currently, protonix drip started as ordered, pt to start bowel prep for procedure in AM.

## 2022-07-08 NOTE — H&P (VIEW-ONLY)
In Patient Consult      Date of Consultation: 2022  Patient Name: Franky Mccall  MRN: [id number]  : 1965     Referring provider: Jimy Workman MD    Primary care provider:  Abel Sweeney DO    Reason for consultation: Severe symptomatic anemia, FOBT positive stool    History of Present Illness: This is a 57-year-old male patient with no significant medical problems was brought into emergency room this morning with complaints of progressive shortness of breathing, generalized weakness fatigue since about 4 to 6 weeks time.    He does not have any primary care physician currently.  He has been noticing a lot of fatigue and mild shortness of breathing after his regular jogging since about 4 to 6 weeks.  As his symptoms did not improve his a sister who is a advanced nurse practitioner neurology advised him to get blood work done and had his labs done yesterday which showed a severe anemia with a hemoglobin of 5 g/dL for which he was sent to emergency room.    He denies any abdominal pain no nausea vomiting.  He has been having some issues with indigestion for which he was taking Tums.  Been having regular bowel movement without any diarrhea or constipation.  He did not see any black stool or blood in the stool or melena.  However in the emergency room today as per ED staff, rectal examination done reveals some dark stool and fecal occult blood test was positive.  He denies any prior history of peptic ulcer disease.  Denies any over-the-counter NSAID use.  No prior endoscopy.  Last colonoscopy was about 5 years ago and had a polyps removed and advised to repeat the colonoscopy in 5 years time.  Colonoscopy report not available.  No family history of any colon cancer or GI malignancy.    In the emergency room today he was noted to have a hemoglobin of 5.6 g/dL with a iron deficiency.  He also had a CT abdomen pelvis done which revealed a large hiatal hernia with almost entire stomach located  in the chest.  GI was called for further evaluation and management.      Subjective     Past Medical History:   Diagnosis Date   • GERD (gastroesophageal reflux disease)        History reviewed. No pertinent surgical history.    History reviewed. No pertinent family history.    Social History     Socioeconomic History   • Marital status:    Tobacco Use   • Smoking status: Never Smoker   • Smokeless tobacco: Never Used   Vaping Use   • Vaping Use: Never used   Substance and Sexual Activity   • Alcohol use: Yes   • Drug use: No   • Sexual activity: Defer         Current Facility-Administered Medications:   •  acetaminophen (TYLENOL) tablet 650 mg, 650 mg, Oral, Q4H PRN, Gaurav, Iliana J, APRN  •  ondansetron (ZOFRAN) injection 4 mg, 4 mg, Intravenous, Q6H PRN, Gaurav, Iliana J, APRN  •  pantoprazole (PROTONIX) 40 mg in 100mL NS IVPB, 8 mg/hr, Intravenous, Continuous, Fransisco Graves, APRN, Last Rate: 20 mL/hr at 07/08/22 1504, 8 mg/hr at 07/08/22 1504  •  polyethylene glycol with electrolytes (GOLYTELY) solution 4,000 mL, 4,000 mL, Oral, See Admin Instructions, Mónica Brambila MD  •  [COMPLETED] Insert peripheral IV, , , Once **AND** sodium chloride 0.9 % flush 10 mL, 10 mL, Intravenous, PRN, Gustabo Gravesneth, APRN  •  sodium chloride 0.9 % flush 10 mL, 10 mL, Intravenous, Q12H, Gaurav, Iliana J, APRN, 10 mL at 07/08/22 1505  •  sodium chloride 0.9 % flush 10 mL, 10 mL, Intravenous, PRN, Gaurav, Iliana J, APRN  •  sodium chloride 0.9 % infusion, 70 mL/hr, Intravenous, Continuous PRN, Mónica Brambila MD    No Known Allergies    Review of Systems   Constitutional: Positive for fatigue. Negative for fever.   HENT: Negative for sore throat and trouble swallowing.    Eyes: Negative for visual disturbance.   Respiratory: Positive for shortness of breath. Negative for cough and chest tightness.    Cardiovascular: Negative for chest pain, palpitations and leg swelling.   Gastrointestinal: Positive for  GERD. Negative for abdominal pain, blood in stool, constipation, diarrhea, nausea, vomiting and indigestion.   Endocrine: Negative for polyphagia.   Genitourinary: Negative for dysuria and hematuria.   Musculoskeletal: Negative for back pain, joint swelling and neck pain.   Skin: Negative for rash, skin lesions and wound.   Neurological: Positive for weakness (Generalized weakness). Negative for dizziness, seizures, speech difficulty, numbness and confusion.   Hematological: Negative for adenopathy. Does not bruise/bleed easily.   Psychiatric/Behavioral: Negative for hallucinations and depressed mood.        The following portions of the patient's history were reviewed and updated as appropriate: allergies, current medications, past family history, past medical history, past social history, past surgical history and problem list.    Objective     Vitals:    07/08/22 1415 07/08/22 1430 07/08/22 1449 07/08/22 1600   BP:   116/65 118/61   BP Location:   Right arm    Patient Position:   Lying    Pulse: 79 80 80 78   Resp:   16 18   Temp:   98.3 °F (36.8 °C) 98.3 °F (36.8 °C)   TempSrc:   Oral    SpO2: 99% 100% 100% 100%   Weight:   91.3 kg (201 lb 4.5 oz)    Height:           Physical Exam  Vitals and nursing note reviewed.   Constitutional:       Appearance: He is well-developed.   HENT:      Head: Normocephalic and atraumatic.      Right Ear: External ear normal.      Left Ear: External ear normal.   Eyes:      Comments: Pallor present   Neck:      Thyroid: No thyromegaly.      Trachea: No tracheal deviation.   Cardiovascular:      Rate and Rhythm: Normal rate and regular rhythm.      Heart sounds: No murmur heard.  Pulmonary:      Effort: Pulmonary effort is normal. No respiratory distress.      Breath sounds: Normal breath sounds.   Abdominal:      General: Bowel sounds are normal. There is no distension.      Palpations: Abdomen is soft. There is no mass.      Tenderness: There is no abdominal tenderness.       Hernia: A hernia (Reducible left inguinal hernia) is present.   Musculoskeletal:         General: Normal range of motion.      Cervical back: Normal range of motion.   Skin:     General: Skin is warm and dry.   Neurological:      Mental Status: He is alert and oriented to person, place, and time.      Cranial Nerves: No cranial nerve deficit.      Sensory: No sensory deficit.   Psychiatric:         Mood and Affect: Mood normal.         Behavior: Behavior normal.         Thought Content: Thought content normal.         Judgment: Judgment normal.         Results from last 7 days   Lab Units 07/08/22  1125 07/07/22  1324   SODIUM mmol/L 137 138   POTASSIUM mmol/L 4.3 4.1   CHLORIDE mmol/L 105 106   CO2 mmol/L 19.6* 25.5   BUN mg/dL 19 15   CREATININE mg/dL 1.07 1.09   CALCIUM mg/dL 8.8 8.6   ALBUMIN g/dL 4.10 4.10   BILIRUBIN mg/dL 0.3 0.2   ALK PHOS U/L 54 53   ALT (SGPT) U/L 14 16   AST (SGOT) U/L 13 16   GLUCOSE mg/dL 101* 143*   WBC 10*3/mm3 6.78 6.20   HEMOGLOBIN g/dL 5.6* 5.7*   PLATELETS 10*3/mm3 397 375   INR  1.09  --        Imaging Results (Last 24 Hours)     Procedure Component Value Units Date/Time    CT Abdomen Pelvis With Contrast [[id number]] Collected: 07/08/22 1220     Updated: 07/08/22 1224    Narrative:      EXAM: CT ABDOMEN PELVIS W CONTRAST-     INDICATION: Anemia.     TECHNIQUE:  Thin section axial images were obtained from the lung bases  to the pubic symphysis following IV contrast administration.  Coronal  reconstruction images were obtained from the axial data.     COMPARISON: None.     FINDINGS:      ABDOMEN: There are several small hypodense liver lesions, favored to be  cysts.  The gallbladder is present.   The spleen, adrenal glands and  pancreas are within normal limits.   The kidneys are within normal  limits.  There is no hydronephrosis, mass or perinephric stranding.    There is a large paraesophageal hernia. The entire stomach is located  within the lower chest. There is no evidence of  small bowel obstruction.    There is no abdominal lymphadenopathy or free fluid.     PELVIS: The prostate and urinary bladder without acute abnormality.    The appendix is normal. There is diverticulosis without diverticulitis.    There is no pelvic lymphadenopathy or pelvic free fluid.  A  fat-containing left inguinal hernia is noted. No acute osseous  abnormalities are identified.       Impression:      Large hiatal hernia.                       1144.10 mGy.cm           This study was performed with techniques to keep radiation doses as low  as reasonably achievable (ALARA). Individualized dose reduction  techniques using automated exposure control or adjustment of mA and/or  kV according to the patient size were employed.      This report was signed and finalized on 7/8/2022 12:22 PM by Paty Wilson MD.    CT Angiogram Chest [[id number]] Collected: 07/08/22 1212     Updated: 07/08/22 1222    Narrative:      PROCEDURE: CT ANGIOGRAM CHEST-     HISTORY: Anemia. Shortness of breath.     PROCEDURE:  Thin section axial images were obtained from the lung apices  to below the diaphragm following i.v. contrast administration per  pulmonary embolus protocol.  3D MIP reconstruction images were obtained  from the axial data  to assist with diagnostic accuracy.     COMPARISON: None.     FINDINGS: The pulmonary arteries are well filled.  There is no evidence  of pulmonary embolus.   The aorta is normal in caliber.  There is no  dissection or intimal flap.    There is no mediastinal, hilar or  axillary lymphadenopathy.   The lungs are clear.   There are no  suspicious pulmonary nodules or masses.  There is no airspace  consolidation.  No pleural or pericardial effusions are present.  There  is a large paraesophageal hiatal hernia. The entire stomach is located  within the chest.     Limited evaluation of the upper abdomen reveals several small hypodense  liver lesions, likely cysts. Remainder of the upper abdomen is  without  acute abnormality. No acute osseous abnormality.       Impression:      1. No evidence of pulmonary embolus or aortic dissection.    2. No acute pleural or pulmonary abnormality.  3. Very large paraesophageal hernia with the entire stomach in the  chest.                       This study was performed with techniques to keep radiation doses as low  as reasonably achievable (ALARA). Individualized dose reduction  techniques using automated exposure control or adjustment of mA and/or  kV according to the patient size were employed.      This report was signed and finalized on 7/8/2022 12:19 PM by Paty Wilson MD.          Assessment / Plan      Assessment/Recommendations:   1.  Symptomatic iron deficiency anemia  2.  FOBT positive stool  3.  Suspected chronic upper GI bleed  4.  Gastroesophageal reflux disease.  5.  Large paraesophageal hernia  6.  Personal history of colon polyps  7.  Nonobstructing left inguinal hernia  8.  Colonic diverticulosis without any signs of diverticulitis    Patient history and work-up is more suggestive of chronic GI bleed.  Given his large hiatal hernia located in the lower chest, patient likely had a Nakul ulcerations and intermittent GI bleed causing anemia.  Other etiologies need to be ruled out including AVMs.  History is not suggestive of lower GI bleed.  Last colonoscopy was about 5 years ago and polyps removed no record available.  No family history of any GI malignancy.  No signs of any overt bleeding.    He needs a EGD and colonoscopy for further evaluation of his anemia  Keep on clears today and n.p.o. after midnight  GoLytely bowel prep this evening  Transfuse 2 years of PRBC and repeat H&H keep the Hgb more than 7 g/dL  He has been scheduled for an urgent EGD colonoscopy in a.m. tomorrow on 7/9/2022  For now Protonix 40 mg IV twice daily  Patient likely need a surgery for his hiatal hernia  Will recommend further after endoscopy.        Thank you very much for  letting me participate in the care of this patient.  Please do not hesitate to call me if you have any questions.    Mónica Brambila MD  [facility]  7/8/2022  17:09 EDT    Please note that portions of this note may have been completed with a voice recognition program.

## 2022-07-08 NOTE — ED PROVIDER NOTES
Subjective   Chief Complaint: Fatigue, abnormal lab    History of Present Illness: This is a 57-year-old male patient comes into the ED today complaining of fatigue has been ongoing for the last 2 months and after seeing his primary care physician yesterday abnormal H&H.  Patient states that over the last month or so he has had worsening shortness of breath with extreme activities.  Patient states he also has been craving ice chips, weird foods.  Patient denies any pain.    Nurses Notes reviewed and agree, including vitals, allergies, social history and prior medical history.                Review of Systems   Constitutional: Positive for fatigue.   Neurological: Positive for weakness.   All other systems reviewed and are negative.      Past Medical History:   Diagnosis Date   • GERD (gastroesophageal reflux disease)        No Known Allergies    History reviewed. No pertinent surgical history.    History reviewed. No pertinent family history.    Social History     Socioeconomic History   • Marital status:    Tobacco Use   • Smoking status: Never Smoker   • Smokeless tobacco: Never Used   Vaping Use   • Vaping Use: Never used   Substance and Sexual Activity   • Alcohol use: Yes   • Drug use: No   • Sexual activity: Defer           Objective   Physical Exam  Vitals and nursing note reviewed.   Constitutional:       Appearance: Normal appearance.   HENT:      Head: Normocephalic and atraumatic.   Eyes:      Extraocular Movements: Extraocular movements intact.      Pupils: Pupils are equal, round, and reactive to light.   Cardiovascular:      Rate and Rhythm: Normal rate and regular rhythm.      Pulses: Normal pulses.      Heart sounds: Normal heart sounds.   Pulmonary:      Effort: Pulmonary effort is normal.      Breath sounds: Normal breath sounds.   Abdominal:      General: Bowel sounds are normal.      Palpations: Abdomen is soft.   Genitourinary:     Prostate: Normal.      Rectum: Guaiac result positive.  "  Musculoskeletal:         General: Normal range of motion.      Cervical back: Normal range of motion and neck supple.   Skin:     General: Skin is warm and dry.      Capillary Refill: Capillary refill takes less than 2 seconds.   Neurological:      Mental Status: He is alert and oriented to person, place, and time. Mental status is at baseline.      GCS: GCS eye subscore is 4. GCS verbal subscore is 5. GCS motor subscore is 6.      Cranial Nerves: Cranial nerves are intact.      Sensory: Sensation is intact.      Motor: Motor function is intact.   Psychiatric:         Attention and Perception: Attention and perception normal.         Mood and Affect: Mood and affect normal.         Speech: Speech normal.         Procedures           ED Course  ED Course as of 07/08/22 1553   Fri Jul 08, 2022   1300 Discussed case with gastroenterology.  [facility] requested the patient be admitted to the hospitalist made n.p.o. for EGD and colonoscopy in the morning [KH]      ED Course User Index  [KH] Fransisco Graves APRN           /65 (BP Location: Right arm, Patient Position: Lying)   Pulse 80   Temp 98.3 °F (36.8 °C) (Oral)   Resp 16   Ht 180.3 cm (71\")   Wt 91.3 kg (201 lb 4.5 oz)   SpO2 100%   BMI 28.07 kg/m²     CT Abdomen Pelvis With Contrast    Result Date: 7/8/2022  EXAM: CT ABDOMEN PELVIS W CONTRAST-  INDICATION: Anemia.  TECHNIQUE:  Thin section axial images were obtained from the lung bases to the pubic symphysis following IV contrast administration.  Coronal reconstruction images were obtained from the axial data.  COMPARISON: None.  FINDINGS:  ABDOMEN: There are several small hypodense liver lesions, favored to be cysts.  The gallbladder is present.   The spleen, adrenal glands and pancreas are within normal limits.   The kidneys are within normal limits.  There is no hydronephrosis, mass or perinephric stranding.  There is a large paraesophageal hernia. The entire stomach is located within the " lower chest. There is no evidence of small bowel obstruction.   There is no abdominal lymphadenopathy or free fluid.  PELVIS: The prostate and urinary bladder without acute abnormality.  The appendix is normal. There is diverticulosis without diverticulitis.  There is no pelvic lymphadenopathy or pelvic free fluid.  A fat-containing left inguinal hernia is noted. No acute osseous abnormalities are identified.      Impression: Large hiatal hernia.        1144.10 mGy.cm    This study was performed with techniques to keep radiation doses as low as reasonably achievable (ALARA). Individualized dose reduction techniques using automated exposure control or adjustment of mA and/or kV according to the patient size were employed.  This report was signed and finalized on 7/8/2022 12:22 PM by Paty Wilson MD.    CT Angiogram Chest    Result Date: 7/8/2022  PROCEDURE: CT ANGIOGRAM CHEST-  HISTORY: Anemia. Shortness of breath.  PROCEDURE:  Thin section axial images were obtained from the lung apices to below the diaphragm following i.v. contrast administration per pulmonary embolus protocol.  3D MIP reconstruction images were obtained from the axial data  to assist with diagnostic accuracy.  COMPARISON: None.  FINDINGS: The pulmonary arteries are well filled.  There is no evidence of pulmonary embolus.   The aorta is normal in caliber.  There is no dissection or intimal flap.    There is no mediastinal, hilar or axillary lymphadenopathy.   The lungs are clear.   There are no suspicious pulmonary nodules or masses.  There is no airspace consolidation.  No pleural or pericardial effusions are present.  There is a large paraesophageal hiatal hernia. The entire stomach is located within the chest.  Limited evaluation of the upper abdomen reveals several small hypodense liver lesions, likely cysts. Remainder of the upper abdomen is without acute abnormality. No acute osseous abnormality.      Impression: 1. No evidence of pulmonary  embolus or aortic dissection.  2. No acute pleural or pulmonary abnormality. 3. Very large paraesophageal hernia with the entire stomach in the chest.        This study was performed with techniques to keep radiation doses as low as reasonably achievable (ALARA). Individualized dose reduction techniques using automated exposure control or adjustment of mA and/or kV according to the patient size were employed.  This report was signed and finalized on 7/8/2022 12:19 PM by Paty Wilson MD.                                    MDM    Final diagnoses:   Anemia due to blood loss, acute   Melena   Iron deficiency anemia, unspecified iron deficiency anemia type       ED Disposition  ED Disposition     ED Disposition   Decision to Admit    Condition   --    Comment   Level of Care: Telemetry [5]   Diagnosis: Anemia due to blood loss, acute [[id number]]   Admitting Physician: AUBREE ASHTON [[id number]]   Attending Physician: AUBREE ASHTON [[id number]]   Certification: I Certify That Inpatient Hospital Services Are Medically Necessary For Greater Than 2 Midnights               No follow-up provider specified.       Medication List      No changes were made to your prescriptions during this visit.          Fransisco Graves APRN  07/08/22 1302       Fransisco Graves APRN  07/08/22 1553

## 2022-07-08 NOTE — CONSULTS
In Patient Consult      Date of Consultation: 2022  Patient Name: Franky Mccall  MRN: [id number]  : 1965     Referring provider: Jimy Workman MD    Primary care provider:  Abel Sweeney DO    Reason for consultation: Severe symptomatic anemia, FOBT positive stool    History of Present Illness: This is a 57-year-old male patient with no significant medical problems was brought into emergency room this morning with complaints of progressive shortness of breathing, generalized weakness fatigue since about 4 to 6 weeks time.    He does not have any primary care physician currently.  He has been noticing a lot of fatigue and mild shortness of breathing after his regular jogging since about 4 to 6 weeks.  As his symptoms did not improve his a sister who is a advanced nurse practitioner neurology advised him to get blood work done and had his labs done yesterday which showed a severe anemia with a hemoglobin of 5 g/dL for which he was sent to emergency room.    He denies any abdominal pain no nausea vomiting.  He has been having some issues with indigestion for which he was taking Tums.  Been having regular bowel movement without any diarrhea or constipation.  He did not see any black stool or blood in the stool or melena.  However in the emergency room today as per ED staff, rectal examination done reveals some dark stool and fecal occult blood test was positive.  He denies any prior history of peptic ulcer disease.  Denies any over-the-counter NSAID use.  No prior endoscopy.  Last colonoscopy was about 5 years ago and had a polyps removed and advised to repeat the colonoscopy in 5 years time.  Colonoscopy report not available.  No family history of any colon cancer or GI malignancy.    In the emergency room today he was noted to have a hemoglobin of 5.6 g/dL with a iron deficiency.  He also had a CT abdomen pelvis done which revealed a large hiatal hernia with almost entire stomach located  in the chest.  GI was called for further evaluation and management.      Subjective     Past Medical History:   Diagnosis Date   • GERD (gastroesophageal reflux disease)        History reviewed. No pertinent surgical history.    History reviewed. No pertinent family history.    Social History     Socioeconomic History   • Marital status:    Tobacco Use   • Smoking status: Never Smoker   • Smokeless tobacco: Never Used   Vaping Use   • Vaping Use: Never used   Substance and Sexual Activity   • Alcohol use: Yes   • Drug use: No   • Sexual activity: Defer         Current Facility-Administered Medications:   •  acetaminophen (TYLENOL) tablet 650 mg, 650 mg, Oral, Q4H PRN, Gaurav, Iliana J, APRN  •  ondansetron (ZOFRAN) injection 4 mg, 4 mg, Intravenous, Q6H PRN, Gaurav, Iliana J, APRN  •  pantoprazole (PROTONIX) 40 mg in 100mL NS IVPB, 8 mg/hr, Intravenous, Continuous, Fransisco Graves, APRN, Last Rate: 20 mL/hr at 07/08/22 1504, 8 mg/hr at 07/08/22 1504  •  polyethylene glycol with electrolytes (GOLYTELY) solution 4,000 mL, 4,000 mL, Oral, See Admin Instructions, Mónica Brambila MD  •  [COMPLETED] Insert peripheral IV, , , Once **AND** sodium chloride 0.9 % flush 10 mL, 10 mL, Intravenous, PRN, Gustabo Gravesneth, APRN  •  sodium chloride 0.9 % flush 10 mL, 10 mL, Intravenous, Q12H, Gaurav, Iliana J, APRN, 10 mL at 07/08/22 1505  •  sodium chloride 0.9 % flush 10 mL, 10 mL, Intravenous, PRN, Gaurav, Iliana J, APRN  •  sodium chloride 0.9 % infusion, 70 mL/hr, Intravenous, Continuous PRN, Mónica Brambila MD    No Known Allergies    Review of Systems   Constitutional: Positive for fatigue. Negative for fever.   HENT: Negative for sore throat and trouble swallowing.    Eyes: Negative for visual disturbance.   Respiratory: Positive for shortness of breath. Negative for cough and chest tightness.    Cardiovascular: Negative for chest pain, palpitations and leg swelling.   Gastrointestinal: Positive for  GERD. Negative for abdominal pain, blood in stool, constipation, diarrhea, nausea, vomiting and indigestion.   Endocrine: Negative for polyphagia.   Genitourinary: Negative for dysuria and hematuria.   Musculoskeletal: Negative for back pain, joint swelling and neck pain.   Skin: Negative for rash, skin lesions and wound.   Neurological: Positive for weakness (Generalized weakness). Negative for dizziness, seizures, speech difficulty, numbness and confusion.   Hematological: Negative for adenopathy. Does not bruise/bleed easily.   Psychiatric/Behavioral: Negative for hallucinations and depressed mood.        The following portions of the patient's history were reviewed and updated as appropriate: allergies, current medications, past family history, past medical history, past social history, past surgical history and problem list.    Objective     Vitals:    07/08/22 1415 07/08/22 1430 07/08/22 1449 07/08/22 1600   BP:   116/65 118/61   BP Location:   Right arm    Patient Position:   Lying    Pulse: 79 80 80 78   Resp:   16 18   Temp:   98.3 °F (36.8 °C) 98.3 °F (36.8 °C)   TempSrc:   Oral    SpO2: 99% 100% 100% 100%   Weight:   91.3 kg (201 lb 4.5 oz)    Height:           Physical Exam  Vitals and nursing note reviewed.   Constitutional:       Appearance: He is well-developed.   HENT:      Head: Normocephalic and atraumatic.      Right Ear: External ear normal.      Left Ear: External ear normal.   Eyes:      Comments: Pallor present   Neck:      Thyroid: No thyromegaly.      Trachea: No tracheal deviation.   Cardiovascular:      Rate and Rhythm: Normal rate and regular rhythm.      Heart sounds: No murmur heard.  Pulmonary:      Effort: Pulmonary effort is normal. No respiratory distress.      Breath sounds: Normal breath sounds.   Abdominal:      General: Bowel sounds are normal. There is no distension.      Palpations: Abdomen is soft. There is no mass.      Tenderness: There is no abdominal tenderness.       Hernia: A hernia (Reducible left inguinal hernia) is present.   Musculoskeletal:         General: Normal range of motion.      Cervical back: Normal range of motion.   Skin:     General: Skin is warm and dry.   Neurological:      Mental Status: He is alert and oriented to person, place, and time.      Cranial Nerves: No cranial nerve deficit.      Sensory: No sensory deficit.   Psychiatric:         Mood and Affect: Mood normal.         Behavior: Behavior normal.         Thought Content: Thought content normal.         Judgment: Judgment normal.         Results from last 7 days   Lab Units 07/08/22  1125 07/07/22  1324   SODIUM mmol/L 137 138   POTASSIUM mmol/L 4.3 4.1   CHLORIDE mmol/L 105 106   CO2 mmol/L 19.6* 25.5   BUN mg/dL 19 15   CREATININE mg/dL 1.07 1.09   CALCIUM mg/dL 8.8 8.6   ALBUMIN g/dL 4.10 4.10   BILIRUBIN mg/dL 0.3 0.2   ALK PHOS U/L 54 53   ALT (SGPT) U/L 14 16   AST (SGOT) U/L 13 16   GLUCOSE mg/dL 101* 143*   WBC 10*3/mm3 6.78 6.20   HEMOGLOBIN g/dL 5.6* 5.7*   PLATELETS 10*3/mm3 397 375   INR  1.09  --        Imaging Results (Last 24 Hours)     Procedure Component Value Units Date/Time    CT Abdomen Pelvis With Contrast [[id number]] Collected: 07/08/22 1220     Updated: 07/08/22 1224    Narrative:      EXAM: CT ABDOMEN PELVIS W CONTRAST-     INDICATION: Anemia.     TECHNIQUE:  Thin section axial images were obtained from the lung bases  to the pubic symphysis following IV contrast administration.  Coronal  reconstruction images were obtained from the axial data.     COMPARISON: None.     FINDINGS:      ABDOMEN: There are several small hypodense liver lesions, favored to be  cysts.  The gallbladder is present.   The spleen, adrenal glands and  pancreas are within normal limits.   The kidneys are within normal  limits.  There is no hydronephrosis, mass or perinephric stranding.    There is a large paraesophageal hernia. The entire stomach is located  within the lower chest. There is no evidence of  small bowel obstruction.    There is no abdominal lymphadenopathy or free fluid.     PELVIS: The prostate and urinary bladder without acute abnormality.    The appendix is normal. There is diverticulosis without diverticulitis.    There is no pelvic lymphadenopathy or pelvic free fluid.  A  fat-containing left inguinal hernia is noted. No acute osseous  abnormalities are identified.       Impression:      Large hiatal hernia.                       1144.10 mGy.cm           This study was performed with techniques to keep radiation doses as low  as reasonably achievable (ALARA). Individualized dose reduction  techniques using automated exposure control or adjustment of mA and/or  kV according to the patient size were employed.      This report was signed and finalized on 7/8/2022 12:22 PM by Paty Wilson MD.    CT Angiogram Chest [[id number]] Collected: 07/08/22 1212     Updated: 07/08/22 1222    Narrative:      PROCEDURE: CT ANGIOGRAM CHEST-     HISTORY: Anemia. Shortness of breath.     PROCEDURE:  Thin section axial images were obtained from the lung apices  to below the diaphragm following i.v. contrast administration per  pulmonary embolus protocol.  3D MIP reconstruction images were obtained  from the axial data  to assist with diagnostic accuracy.     COMPARISON: None.     FINDINGS: The pulmonary arteries are well filled.  There is no evidence  of pulmonary embolus.   The aorta is normal in caliber.  There is no  dissection or intimal flap.    There is no mediastinal, hilar or  axillary lymphadenopathy.   The lungs are clear.   There are no  suspicious pulmonary nodules or masses.  There is no airspace  consolidation.  No pleural or pericardial effusions are present.  There  is a large paraesophageal hiatal hernia. The entire stomach is located  within the chest.     Limited evaluation of the upper abdomen reveals several small hypodense  liver lesions, likely cysts. Remainder of the upper abdomen is  without  acute abnormality. No acute osseous abnormality.       Impression:      1. No evidence of pulmonary embolus or aortic dissection.    2. No acute pleural or pulmonary abnormality.  3. Very large paraesophageal hernia with the entire stomach in the  chest.                       This study was performed with techniques to keep radiation doses as low  as reasonably achievable (ALARA). Individualized dose reduction  techniques using automated exposure control or adjustment of mA and/or  kV according to the patient size were employed.      This report was signed and finalized on 7/8/2022 12:19 PM by Paty Wilson MD.          Assessment / Plan      Assessment/Recommendations:   1.  Symptomatic iron deficiency anemia  2.  FOBT positive stool  3.  Suspected chronic upper GI bleed  4.  Gastroesophageal reflux disease.  5.  Large paraesophageal hernia  6.  Personal history of colon polyps  7.  Nonobstructing left inguinal hernia  8.  Colonic diverticulosis without any signs of diverticulitis    Patient history and work-up is more suggestive of chronic GI bleed.  Given his large hiatal hernia located in the lower chest, patient likely had a Nakul ulcerations and intermittent GI bleed causing anemia.  Other etiologies need to be ruled out including AVMs.  History is not suggestive of lower GI bleed.  Last colonoscopy was about 5 years ago and polyps removed no record available.  No family history of any GI malignancy.  No signs of any overt bleeding.    He needs a EGD and colonoscopy for further evaluation of his anemia  Keep on clears today and n.p.o. after midnight  GoLytely bowel prep this evening  Transfuse 2 years of PRBC and repeat H&H keep the Hgb more than 7 g/dL  He has been scheduled for an urgent EGD colonoscopy in a.m. tomorrow on 7/9/2022  For now Protonix 40 mg IV twice daily  Patient likely need a surgery for his hiatal hernia  Will recommend further after endoscopy.        Thank you very much for  letting me participate in the care of this patient.  Please do not hesitate to call me if you have any questions.    Mónica Brambila MD  [facility]  7/8/2022  17:09 EDT    Please note that portions of this note may have been completed with a voice recognition program.

## 2022-07-08 NOTE — H&P
[facility]      Name:  Franky Mccall   Age:  57 y.o.  Sex:  male  :  1965  MRN:  [id number]   Visit Number:  [id number]  Admission Date:  2022  Date Of Service:  22  Primary Care Physician:  Abel Sweeney DO    Chief Complaint:     Abnormal lab value/fatigue/SOA    History Of Presenting Illness:      57-year-old male presented to the emergency department for abnormal lab value.  Patient was called this morning and told to report to the emergency department for hemoglobin of 5.6.  No pertinent past medical/family history identified.  Associated symptoms include fatigue, shortness of air, pallor, and pica cravings.  Onset of symptoms approximately 1 month ago.  Patient denies any current abdominal or epigastric pain, hematochezia, hematemesis, or excessive NSAID/ASA use.  Of note, patient has been taking TUMS 5-6 times daily due to severe indigestion which mostly occurs at night.  History of colonoscopy approximately 5 years ago which noted polyps.  Patient currently lives with his wife.  Upon ED presentation patient with stable vital signs.  Pertinent lab values include iron 9, iron saturation 2, hemoglobin 5.6, fecal occult blood positive, and CT of abdomen pelvis revealing a large hiatal hernia with the entire stomach located within the lower chest.  Gastroenterologist Dr. Brambila was consulted with planned EGD and colonoscopy in the morning.  Patient currently on clear liquid diet and n.p.o. after midnight.  2 units of packed red blood cells ordered per ED provider.  Protonix initiated.  Hospitalist consulted for further medical management.    Review Of Systems:    All systems were reviewed and negative except as mentioned in history of presenting illness, assessment and plan.    Past Medical History: Patient  has a past medical history of GERD (gastroesophageal reflux disease).    Past Surgical History: Reviewed- Colonoscopy 5 years  "ago.    Social History: Patient  reports that he has never smoked. He has never used smokeless tobacco. He reports current alcohol use. He reports that he does not use drugs.    Family History: Reviewed. Noncontributory.    Allergies:      Patient has no known allergies.    Home Medications:    Prior to Admission Medications     None        ED Medications:    Medications   sodium chloride 0.9 % flush 10 mL (has no administration in time range)   sodium chloride 0.9 % infusion (has no administration in time range)   polyethylene glycol with electrolytes (GOLYTELY) solution 4,000 mL (has no administration in time range)   sodium chloride 0.9 % bolus 1,000 mL (0 mL Intravenous Stopped 7/8/22 1300)   ferric gluconate (FERRLECIT) 250 mg in sodium chloride 0.9 % 100 mL IVPB (250 mg Intravenous New Bag 7/8/22 1218)   iopamidol (ISOVUE-300) 61 % injection 100 mL (100 mL Intravenous Given 7/8/22 1207)     Vital Signs:  Temp:  [97.6 °F (36.4 °C)-98.6 °F (37 °C)] 98.6 °F (37 °C)  Heart Rate:  [77-87] 84  Resp:  [16] 16  BP: (115-130)/(49-80) 122/80        07/08/22  1055   Weight: 91.6 kg (202 lb)     Body mass index is 28.17 kg/m².    Physical Exam:     Most recent vital Signs: /80   Pulse 84   Temp 98.6 °F (37 °C) (Oral)   Resp 16   Ht 180.3 cm (71\")   Wt 91.6 kg (202 lb)   SpO2 100%   BMI 28.17 kg/m²     Physical Exam  Vitals and nursing note reviewed.   Constitutional:       Comments: Pale. Well appearing. Well groomed.   HENT:      Head: Normocephalic.      Mouth/Throat:      Mouth: Mucous membranes are moist.   Eyes:      Pupils: Pupils are equal, round, and reactive to light.   Cardiovascular:      Rate and Rhythm: Normal rate and regular rhythm.      Pulses: Normal pulses.      Heart sounds: Normal heart sounds.   Pulmonary:      Effort: Pulmonary effort is normal.      Breath sounds: Normal breath sounds.   Abdominal:      General: Bowel sounds are normal.      Palpations: Abdomen is soft. "   Musculoskeletal:         General: Normal range of motion.      Cervical back: Normal range of motion.   Skin:     General: Skin is warm.      Capillary Refill: Capillary refill takes less than 2 seconds.   Neurological:      General: No focal deficit present.      Mental Status: He is alert.   Psychiatric:         Mood and Affect: Mood normal.         Laboratory data:    I have reviewed the labs done in the emergency room.    Results from last 7 days   Lab Units 07/08/22  1125 07/07/22  1324   SODIUM mmol/L 137 138   POTASSIUM mmol/L 4.3 4.1   CHLORIDE mmol/L 105 106   CO2 mmol/L 19.6* 25.5   BUN mg/dL 19 15   CREATININE mg/dL 1.07 1.09   CALCIUM mg/dL 8.8 8.6   BILIRUBIN mg/dL 0.3 0.2   ALK PHOS U/L 54 53   ALT (SGPT) U/L 14 16   AST (SGOT) U/L 13 16   GLUCOSE mg/dL 101* 143*     Results from last 7 days   Lab Units 07/08/22  1125 07/07/22  1324   WBC 10*3/mm3 6.78 6.20   HEMOGLOBIN g/dL 5.6* 5.7*   HEMATOCRIT % 20.4* 21.0*   PLATELETS 10*3/mm3 397 375     Results from last 7 days   Lab Units 07/08/22  1125   INR  1.09     Results from last 7 days   Lab Units 07/08/22  1125   CK TOTAL U/L 68                     Results from last 7 days   Lab Units 07/08/22  1126   COLOR UA  Yellow   GLUCOSE UA  Negative   KETONES UA  Negative   LEUKOCYTES UA  Small (1+)*   PH, URINE  <=5.0   BILIRUBIN UA  Negative   UROBILINOGEN UA  0.2 E.U./dL   RBC UA /HPF 0-2*   WBC UA /HPF 0-2*       Radiology:    CT Abdomen Pelvis With Contrast    Result Date: 7/8/2022  EXAM: CT ABDOMEN PELVIS W CONTRAST-  INDICATION: Anemia.  TECHNIQUE:  Thin section axial images were obtained from the lung bases to the pubic symphysis following IV contrast administration.  Coronal reconstruction images were obtained from the axial data.  COMPARISON: None.  FINDINGS:  ABDOMEN: There are several small hypodense liver lesions, favored to be cysts.  The gallbladder is present.   The spleen, adrenal glands and pancreas are within normal limits.   The kidneys  are within normal limits.  There is no hydronephrosis, mass or perinephric stranding.  There is a large paraesophageal hernia. The entire stomach is located within the lower chest. There is no evidence of small bowel obstruction.   There is no abdominal lymphadenopathy or free fluid.  PELVIS: The prostate and urinary bladder without acute abnormality.  The appendix is normal. There is diverticulosis without diverticulitis.  There is no pelvic lymphadenopathy or pelvic free fluid.  A fat-containing left inguinal hernia is noted. No acute osseous abnormalities are identified.      Large hiatal hernia.        1144.10 mGy.cm    This study was performed with techniques to keep radiation doses as low as reasonably achievable (ALARA). Individualized dose reduction techniques using automated exposure control or adjustment of mA and/or kV according to the patient size were employed.  This report was signed and finalized on 7/8/2022 12:22 PM by Paty Wilson MD.    CT Angiogram Chest    Result Date: 7/8/2022  PROCEDURE: CT ANGIOGRAM CHEST-  HISTORY: Anemia. Shortness of breath.  PROCEDURE:  Thin section axial images were obtained from the lung apices to below the diaphragm following i.v. contrast administration per pulmonary embolus protocol.  3D MIP reconstruction images were obtained from the axial data  to assist with diagnostic accuracy.  COMPARISON: None.  FINDINGS: The pulmonary arteries are well filled.  There is no evidence of pulmonary embolus.   The aorta is normal in caliber.  There is no dissection or intimal flap.    There is no mediastinal, hilar or axillary lymphadenopathy.   The lungs are clear.   There are no suspicious pulmonary nodules or masses.  There is no airspace consolidation.  No pleural or pericardial effusions are present.  There is a large paraesophageal hiatal hernia. The entire stomach is located within the chest.  Limited evaluation of the upper abdomen reveals several small hypodense liver  lesions, likely cysts. Remainder of the upper abdomen is without acute abnormality. No acute osseous abnormality.      1. No evidence of pulmonary embolus or aortic dissection.  2. No acute pleural or pulmonary abnormality. 3. Very large paraesophageal hernia with the entire stomach in the chest.        This study was performed with techniques to keep radiation doses as low as reasonably achievable (ALARA). Individualized dose reduction techniques using automated exposure control or adjustment of mA and/or kV according to the patient size were employed.  This report was signed and finalized on 7/8/2022 12:19 PM by Paty Wilson MD.      Assessment:    1. Acute Blood loss Anemia 2/2 GI bleed  2. Acute GI bleed  3. Paraesophageal Hernia  4. BMI 28    Plan:    Acute Blood loss Anemia 2/2 GI bleed/Melena  - In process of receiving PRBC's x2 units for Hgb 5.6 upon arrival  - Protonix  - No overt bleeding noted currently  - Clears- NPO After midnight  - Planned EGD/Colonoscopy in AM per Patty  - IV iron infusion with fluids    Further orders as clinical course dictates.    Risk Assessment: high  DVT Prophylaxis: SCD's  Code Status: Full Code  Diet: Clears- NPO after midnight    Advance Care Planning   ACP discussion was declined by the patient. Patient does not have an advance directive, declines further assistance.           Iliana Nolasco, APRN  07/08/22  13:23 EDT    Dictated utilizing Dragon dictation.

## 2022-07-09 ENCOUNTER — ANESTHESIA (OUTPATIENT)
Dept: GASTROENTEROLOGY | Facility: HOSPITAL | Age: 57
End: 2022-07-09

## 2022-07-09 ENCOUNTER — ANESTHESIA EVENT (OUTPATIENT)
Dept: GASTROENTEROLOGY | Facility: HOSPITAL | Age: 57
End: 2022-07-09

## 2022-07-09 ENCOUNTER — READMISSION MANAGEMENT (OUTPATIENT)
Dept: CALL CENTER | Facility: HOSPITAL | Age: 57
End: 2022-07-09

## 2022-07-09 VITALS
SYSTOLIC BLOOD PRESSURE: 104 MMHG | WEIGHT: 197.09 LBS | DIASTOLIC BLOOD PRESSURE: 70 MMHG | BODY MASS INDEX: 27.59 KG/M2 | HEART RATE: 73 BPM | RESPIRATION RATE: 16 BRPM | OXYGEN SATURATION: 99 % | TEMPERATURE: 98.5 F | HEIGHT: 71 IN

## 2022-07-09 DIAGNOSIS — K44.9 PARAESOPHAGEAL HERNIA: Primary | ICD-10-CM

## 2022-07-09 DIAGNOSIS — K25.3 ACUTE CAMERON ULCER: ICD-10-CM

## 2022-07-09 LAB
DEPRECATED RDW RBC AUTO: 47.6 FL (ref 37–54)
ERYTHROCYTE [DISTWIDTH] IN BLOOD BY AUTOMATED COUNT: 18.3 % (ref 12.3–15.4)
HCT VFR BLD AUTO: 22.5 % (ref 37.5–51)
HGB BLD-MCNC: 6.6 G/DL (ref 13–17.7)
MCH RBC QN AUTO: 21.3 PG (ref 26.6–33)
MCHC RBC AUTO-ENTMCNC: 29.3 G/DL (ref 31.5–35.7)
MCV RBC AUTO: 72.6 FL (ref 79–97)
PLATELET # BLD AUTO: 285 10*3/MM3 (ref 140–450)
PMV BLD AUTO: 9.8 FL (ref 6–12)
RBC # BLD AUTO: 3.1 10*6/MM3 (ref 4.14–5.8)
WBC NRBC COR # BLD: 8.66 10*3/MM3 (ref 3.4–10.8)

## 2022-07-09 PROCEDURE — G0378 HOSPITAL OBSERVATION PER HR: HCPCS

## 2022-07-09 PROCEDURE — P9016 RBC LEUKOCYTES REDUCED: HCPCS

## 2022-07-09 PROCEDURE — 43239 EGD BIOPSY SINGLE/MULTIPLE: CPT | Performed by: INTERNAL MEDICINE

## 2022-07-09 PROCEDURE — 85027 COMPLETE CBC AUTOMATED: CPT | Performed by: NURSE PRACTITIONER

## 2022-07-09 PROCEDURE — 25010000002 PROPOFOL 1000 MG/100ML EMULSION: Performed by: NURSE ANESTHETIST, CERTIFIED REGISTERED

## 2022-07-09 PROCEDURE — 86900 BLOOD TYPING SEROLOGIC ABO: CPT

## 2022-07-09 PROCEDURE — 96366 THER/PROPH/DIAG IV INF ADDON: CPT

## 2022-07-09 PROCEDURE — 25010000002 FENTANYL CITRATE (PF) 100 MCG/2ML SOLUTION: Performed by: NURSE ANESTHETIST, CERTIFIED REGISTERED

## 2022-07-09 PROCEDURE — 36430 TRANSFUSION BLD/BLD COMPNT: CPT

## 2022-07-09 PROCEDURE — 45378 DIAGNOSTIC COLONOSCOPY: CPT | Performed by: INTERNAL MEDICINE

## 2022-07-09 PROCEDURE — 99217 PR OBSERVATION CARE DISCHARGE MANAGEMENT: CPT | Performed by: NURSE PRACTITIONER

## 2022-07-09 RX ORDER — FERROUS SULFATE 325(65) MG
325 TABLET ORAL
Qty: 60 TABLET | Refills: 0 | Status: SHIPPED | OUTPATIENT
Start: 2022-07-09 | End: 2022-09-14

## 2022-07-09 RX ORDER — PROPOFOL 10 MG/ML
INJECTION, EMULSION INTRAVENOUS AS NEEDED
Status: DISCONTINUED | OUTPATIENT
Start: 2022-07-09 | End: 2022-07-09 | Stop reason: SURG

## 2022-07-09 RX ORDER — PANTOPRAZOLE SODIUM 40 MG/1
40 TABLET, DELAYED RELEASE ORAL
Status: CANCELLED | OUTPATIENT
Start: 2022-07-09

## 2022-07-09 RX ORDER — LIDOCAINE HYDROCHLORIDE 20 MG/ML
INJECTION, SOLUTION INTRAVENOUS AS NEEDED
Status: DISCONTINUED | OUTPATIENT
Start: 2022-07-09 | End: 2022-07-09 | Stop reason: SURG

## 2022-07-09 RX ORDER — FENTANYL CITRATE 50 UG/ML
INJECTION, SOLUTION INTRAMUSCULAR; INTRAVENOUS AS NEEDED
Status: DISCONTINUED | OUTPATIENT
Start: 2022-07-09 | End: 2022-07-09 | Stop reason: SURG

## 2022-07-09 RX ORDER — PANTOPRAZOLE SODIUM 40 MG/1
40 TABLET, DELAYED RELEASE ORAL DAILY
Qty: 30 TABLET | Refills: 0 | Status: SHIPPED | OUTPATIENT
Start: 2022-07-09 | End: 2022-08-09 | Stop reason: SDUPTHER

## 2022-07-09 RX ORDER — SIMETHICONE 20 MG/.3ML
EMULSION ORAL AS NEEDED
Status: DISCONTINUED | OUTPATIENT
Start: 2022-07-09 | End: 2022-07-09 | Stop reason: HOSPADM

## 2022-07-09 RX ADMIN — ACETAMINOPHEN 650 MG: 325 TABLET ORAL at 00:44

## 2022-07-09 RX ADMIN — PANTOPRAZOLE SODIUM 8 MG/HR: 40 INJECTION, POWDER, FOR SOLUTION INTRAVENOUS at 04:56

## 2022-07-09 RX ADMIN — FENTANYL CITRATE 100 MCG: 50 INJECTION, SOLUTION INTRAMUSCULAR; INTRAVENOUS at 09:26

## 2022-07-09 RX ADMIN — PANTOPRAZOLE SODIUM 8 MG/HR: 40 INJECTION, POWDER, FOR SOLUTION INTRAVENOUS at 00:08

## 2022-07-09 RX ADMIN — PROPOFOL 100 MG: 10 INJECTION, EMULSION INTRAVENOUS at 09:48

## 2022-07-09 RX ADMIN — PROPOFOL 100 MG: 10 INJECTION, EMULSION INTRAVENOUS at 09:26

## 2022-07-09 RX ADMIN — LIDOCAINE HYDROCHLORIDE 100 MG: 20 INJECTION, SOLUTION INTRAVENOUS at 09:26

## 2022-07-09 NOTE — PLAN OF CARE
Problem: Adult Inpatient Plan of Care  Goal: Plan of Care Review  Outcome: Ongoing, Progressing  Flowsheets (Taken 7/9/2022 0543)  Progress: improving  Goal: Patient-Specific Goal (Individualized)  Outcome: Ongoing, Progressing  Goal: Absence of Hospital-Acquired Illness or Injury  Outcome: Ongoing, Progressing  Intervention: Identify and Manage Fall Risk  Recent Flowsheet Documentation  Taken 7/9/2022 0450 by Alka Kelley RN  Safety Promotion/Fall Prevention: safety round/check completed  Taken 7/9/2022 0200 by Alka Kelley RN  Safety Promotion/Fall Prevention: safety round/check completed  Taken 7/9/2022 0010 by Alka Kelley RN  Safety Promotion/Fall Prevention: safety round/check completed  Taken 7/8/2022 2203 by Alka Kelley RN  Safety Promotion/Fall Prevention: safety round/check completed  Taken 7/8/2022 2015 by Alka Kelley RN  Safety Promotion/Fall Prevention: safety round/check completed  Taken 7/8/2022 1930 by Alka Kelley RN  Safety Promotion/Fall Prevention: safety round/check completed  Intervention: Prevent Skin Injury  Recent Flowsheet Documentation  Taken 7/9/2022 0450 by Alka Kelley RN  Body Position: position changed independently  Taken 7/9/2022 0200 by Alka Kelley RN  Body Position: position changed independently  Taken 7/9/2022 0010 by Alka Kelley RN  Body Position: position changed independently  Taken 7/8/2022 2203 by Alka Kelley RN  Body Position: position changed independently  Taken 7/8/2022 2015 by Alka Kelley RN  Body Position: position changed independently  Taken 7/8/2022 1930 by Alka Kelley RN  Body Position: position changed independently  Goal: Optimal Comfort and Wellbeing  Outcome: Ongoing, Progressing  Goal: Readiness for Transition of Care  Outcome: Ongoing, Progressing     Problem: Anemia  Goal: Anemia Symptom Improvement  Outcome: Ongoing, Progressing  Intervention: Monitor and Manage Anemia  Recent  Flowsheet Documentation  Taken 7/9/2022 0450 by Alka Kelley, RN  Safety Promotion/Fall Prevention: safety round/check completed  Taken 7/9/2022 0200 by Alka Kelley RN  Safety Promotion/Fall Prevention: safety round/check completed  Taken 7/9/2022 0010 by Alka Kelley RN  Safety Promotion/Fall Prevention: safety round/check completed  Taken 7/8/2022 2203 by Alka Kelley, RN  Safety Promotion/Fall Prevention: safety round/check completed  Taken 7/8/2022 2015 by Alka Kelley, RN  Safety Promotion/Fall Prevention: safety round/check completed  Taken 7/8/2022 1930 by Alka Kelley, RN  Safety Promotion/Fall Prevention: safety round/check completed   Goal Outcome Evaluation:           Progress: improving

## 2022-07-09 NOTE — DISCHARGE SUMMARY
[facility]   DISCHARGE SUMMARY      Name:  Franky Mccall   Age:  57 y.o.  Sex:  male  :  1965  MRN:  [id number]   Visit Number:  [id number]    Admission Date:  2022  Date of Discharge:  2022  Primary Care Physician:  Abel Sweeney, DO    Important issues to note:    - Admitted due to anemia with Hgb upon admission 5.6  - Given 3 units PRBC's  - GI Dr. Brambila performed Colonoscopy/EGD and noted large non-bleeding gastric ulcer  - Unable to pass scope into duodenum 2/2 large paraesophageal hernia- Will require surgical follow up- Patient wanting to see Dr. Juarez  - Dr. Brambila recommended follow up with GI in 4-8 weeks with Esophagogram completed outpatient.  - Will continue PPI and oral FE tablets x 3 months  - High Fiber diet  -Avoid NSAIDS    Discharge Diagnoses:     1. Acute Blood loss/Iron Deficiency Anemia 2/2 GI bleed  2. Acute GI bleed  3. Large Non-bleeding Gastric Ulcer  4. Paraesophageal Hernia  5. BMI 28      Problem List:     Active Hospital Problems    Diagnosis  POA   • **Anemia due to blood loss, acute [D62]  Yes   • Iron deficiency anemia due to chronic blood loss [D50.0]  Yes      Resolved Hospital Problems   No resolved problems to display.     Presenting Problem:    Chief Complaint   Patient presents with   • Abnormal Lab   • Fatigue      Consults:     Consulting Physician(s)  Chat With All Active Members    Provider Relationship Specialty    Mónica Brambila MD  Consulting Physician Gastroenterology        Procedures Performed:    Procedure(s):  ESOPHAGOGASTRODUODENOSCOPY with biopsy  COLONOSCOPY    History of presenting illness/Hospital Course:    57-year-old male presented to the emergency department for abnormal lab value.  Patient was called and told to report to the emergency department for hemoglobin of 5.6.  No pertinent past medical/family history identified.  Associated symptoms included fatigue, shortness of air, pallor, and  pica cravings.  Onset of symptoms approximately 1 month ago.  Patient denied any abdominal or epigastric pain, hematochezia, hematemesis, or excessive NSAID/ASA use.  Of note, patient had been taking TUMS 5-6 times daily due to severe indigestion which mostly occurred at night.  History of colonoscopy approximately 5 years ago which noted polyps.      Upon ED presentation patient with stable vital signs.  Pertinent lab values included iron 9, iron saturation 2, hemoglobin 5.6, fecal occult blood positive, and CT of abdomen pelvis revealing a large hiatal hernia with the entire stomach located within the lower chest.  Gastroenterologist Dr. Brambila was consulted with planned EGD and colonoscopy.  Patient given 2 units of packed red blood cells ordered per ED provider.  Protonix initiated.  Hospitalist consulted for further medical management. Hemoglobin noted to be 6.6 with AM labs likely dilutional given 1 liter fluid bolus also given.  Patient taken for colonoscopy and EGD per gastroenterology Dr. Brambila.  Patient was noted to have a large nonbleeding gastric ulcer with large paraesophageal hernia noted.  Patient tolerated procedure well with slight hypotension noted in recovery.  Otherwise patient symptoms overall improved.  Patient will be discharged home with Protonix and p.o. iron tablets.  Patient advised to follow with surgeon regarding large paraesophageal hernia.  Also advised to follow with gastroenterologist in 4 to 8 weeks with esophagogram outpatient recommended per Dr. Brambila. Patient to continue mechanical soft diet today with high fiber diet otherwise. Advised to avoid NSAIDS. Strict return precautions given.    Vital Signs:    Temp:  [97.6 °F (36.4 °C)-100.4 °F (38 °C)] 98.3 °F (36.8 °C)  Heart Rate:  [65-97] 68  Resp:  [7-21] 14  BP: ()/(50-91) 105/67    Physical Exam:    General Appearance:  Alert and cooperative. Pleasant/middle aged male.   Head:  Atraumatic and normocephalic.   Eyes:  Conjunctivae and sclerae normal, no icterus. No pallor.   Ears:  Ears with no abnormalities noted.   Throat: No oral lesions, no thrush, oral mucosa moist.   Neck: Supple, trachea midline, no thyromegaly.   Back:   No kyphoscoliosis present. No tenderness to palpation.   Lungs:   Breath sounds heard bilaterally equally.  No crackles or wheezing. No Pleural rub or bronchial breathing.   Heart:  Normal S1 and S2, no murmur, no gallop, no rub. No JVD.   Abdomen:   Normal bowel sounds, no masses, no organomegaly. Soft, nontender, nondistended, no rebound tenderness.   Extremities: Supple, no edema, no cyanosis, no clubbing.   Pulses: Pulses palpable bilaterally.   Skin: No bleeding or rash.   Neurologic: Alert and oriented x 3. No facial asymmetry. Moves all four limbs. No tremors.     Pertinent Lab Results:     Results from last 7 days   Lab Units 07/08/22  1125 07/07/22  1324   SODIUM mmol/L 137 138   POTASSIUM mmol/L 4.3 4.1   CHLORIDE mmol/L 105 106   CO2 mmol/L 19.6* 25.5   BUN mg/dL 19 15   CREATININE mg/dL 1.07 1.09   CALCIUM mg/dL 8.8 8.6   BILIRUBIN mg/dL 0.3 0.2   ALK PHOS U/L 54 53   ALT (SGPT) U/L 14 16   AST (SGOT) U/L 13 16   GLUCOSE mg/dL 101* 143*     Results from last 7 days   Lab Units 07/09/22  0548 07/08/22  1125 07/07/22  1324   WBC 10*3/mm3 8.66 6.78 6.20   HEMOGLOBIN g/dL 6.6* 5.6* 5.7*   HEMATOCRIT % 22.5* 20.4* 21.0*   PLATELETS 10*3/mm3 285 397 375     Results from last 7 days   Lab Units 07/08/22  1125   INR  1.09     Results from last 7 days   Lab Units 07/08/22  1125   CK TOTAL U/L 68                           Pertinent Radiology Results:    Imaging Results (All)     Procedure Component Value Units Date/Time    CT Abdomen Pelvis With Contrast [[id number]] Collected: 07/08/22 1220     Updated: 07/08/22 1224    Narrative:      EXAM: CT ABDOMEN PELVIS W CONTRAST-     INDICATION: Anemia.     TECHNIQUE:  Thin section axial images were obtained from the lung bases  to the pubic symphysis following  IV contrast administration.  Coronal  reconstruction images were obtained from the axial data.     COMPARISON: None.     FINDINGS:      ABDOMEN: There are several small hypodense liver lesions, favored to be  cysts.  The gallbladder is present.   The spleen, adrenal glands and  pancreas are within normal limits.   The kidneys are within normal  limits.  There is no hydronephrosis, mass or perinephric stranding.    There is a large paraesophageal hernia. The entire stomach is located  within the lower chest. There is no evidence of small bowel obstruction.    There is no abdominal lymphadenopathy or free fluid.     PELVIS: The prostate and urinary bladder without acute abnormality.    The appendix is normal. There is diverticulosis without diverticulitis.    There is no pelvic lymphadenopathy or pelvic free fluid.  A  fat-containing left inguinal hernia is noted. No acute osseous  abnormalities are identified.       Impression:      Large hiatal hernia.                       1144.10 mGy.cm           This study was performed with techniques to keep radiation doses as low  as reasonably achievable (ALARA). Individualized dose reduction  techniques using automated exposure control or adjustment of mA and/or  kV according to the patient size were employed.      This report was signed and finalized on 7/8/2022 12:22 PM by Paty Wilson MD.    CT Angiogram Chest [[id number]] Collected: 07/08/22 1212     Updated: 07/08/22 1222    Narrative:      PROCEDURE: CT ANGIOGRAM CHEST-     HISTORY: Anemia. Shortness of breath.     PROCEDURE:  Thin section axial images were obtained from the lung apices  to below the diaphragm following i.v. contrast administration per  pulmonary embolus protocol.  3D MIP reconstruction images were obtained  from the axial data  to assist with diagnostic accuracy.     COMPARISON: None.     FINDINGS: The pulmonary arteries are well filled.  There is no evidence  of pulmonary embolus.   The aorta is  normal in caliber.  There is no  dissection or intimal flap.    There is no mediastinal, hilar or  axillary lymphadenopathy.   The lungs are clear.   There are no  suspicious pulmonary nodules or masses.  There is no airspace  consolidation.  No pleural or pericardial effusions are present.  There  is a large paraesophageal hiatal hernia. The entire stomach is located  within the chest.     Limited evaluation of the upper abdomen reveals several small hypodense  liver lesions, likely cysts. Remainder of the upper abdomen is without  acute abnormality. No acute osseous abnormality.       Impression:      1. No evidence of pulmonary embolus or aortic dissection.    2. No acute pleural or pulmonary abnormality.  3. Very large paraesophageal hernia with the entire stomach in the  chest.                       This study was performed with techniques to keep radiation doses as low  as reasonably achievable (ALARA). Individualized dose reduction  techniques using automated exposure control or adjustment of mA and/or  kV according to the patient size were employed.      This report was signed and finalized on 7/8/2022 12:19 PM by Paty Wilson MD.          Echo:      Condition on Discharge:      Stable.    Code status during the hospital stay:    Code Status and Medical Interventions:   Ordered at: 07/08/22 1329     Level Of Support Discussed With:    Patient     Code Status (Patient has no pulse and is not breathing):    CPR (Attempt to Resuscitate)     Medical Interventions (Patient has pulse or is breathing):    Full Support     Discharge Disposition:    Home or Self Care    Discharge Medications:       Discharge Medications      New Medications      Instructions Start Date   ferrous sulfate 325 (65 FE) MG tablet  Commonly known as: FerrouSul   325 mg, Oral, Daily With Breakfast & Lunch      pantoprazole 40 MG EC tablet  Commonly known as: Protonix   40 mg, Oral, Daily           Discharge Diet:     Diet Instructions      Diet: Regular      Discharge Diet: Regular    Mechanical soft today- Continue high fiber otherwise-        Activity at Discharge:     Activity Instructions     Activity as Tolerated      Given procedure today- no operating heavy equipment-        Follow-up Appointments:     Follow-up Information     Abel Sweeney,  Follow up.    Specialty: Family Medicine  Why: patient already scheduled to see Tuesday- repeat CBC  Contact information:  [address]  [address]  [facility] [address]  [phone]             Mónica Brambila MD Follow up in 6 week(s).    Specialty: Gastroenterology  Contact information:  [address]  [facility] [address]  [phone]             Faisal Juarez MD Follow up in 1 month(s).    Specialty: General Surgery  Why: Paraesophageal hernia  Contact information:  [address]  [facility] [address]  [phone]                       Future Appointments   Date Time Provider Department Center   7/19/2022  9:00 AM Sarah Beth Arreguin APRN [facility]   3/13/2023  3:10 PM Madi Dominguez MD [facility]     Test Results Pending at Discharge:    Pending Labs     Order Current Status    TISSUE EXAM, [facility] ([facility]) Collected (07/09/22 0935)             SANJUANA Echevarria  07/09/22  11:18 EDT    Time: I spent > 30 minutes on this discharge activity which included: face-to-face encounter with the patient, reviewing the data in the system, coordination of the care with the nursing staff as well as consultants, documentation, and entering orders.     Dictated utilizing Dragon dictation.

## 2022-07-09 NOTE — SIGNIFICANT NOTE
1032- Dr. Brambila at bedside, insturcted as to findings and continuing plan of care. Pt. Acknowledged understanding. MD. Paper work left for pt.

## 2022-07-09 NOTE — DISCHARGE INSTRUCTIONS
Patient to be discharged home today with wife.  GI Dr. Brambila performed Colonoscopy/EGD and noted large non-bleeding gastric ulcer  - Unable to pass scope into duodenum 2/2 large paraesophageal hernia- Will require surgical follow up  - Dr. Brambila recommended follow up with GI in 4-8 weeks with Esophagogram completed outpatient.  - Will continue PPI and oral iron tablets.  - High Fiber diet  -Avoid NSAIDS- Motrin-Advil-Ibuprofen  - Return to ED for any further worsening symptoms or concerns.  - PCP follow up in 1 week for repeat CBC.

## 2022-07-09 NOTE — ANESTHESIA POSTPROCEDURE EVALUATION
Patient: Franky Mccall    Procedure Summary     Date: 07/09/22 Room / Location: [facility] ENDOSCOPY 2 / [facility] ENDOSCOPY    Anesthesia Start: 0927 Anesthesia Stop: 1005    Procedures:       ESOPHAGOGASTRODUODENOSCOPY with biopsy (N/A Esophagus)      COLONOSCOPY (N/A Anus) Diagnosis:       Iron deficiency anemia due to chronic blood loss      (Iron deficiency anemia due to chronic blood loss [D50.0])    Surgeons: Mónica Brambila MD Provider: Faisal Crain CRNA    Anesthesia Type: MAC ASA Status: 2 - Emergent          Anesthesia Type: MAC    Vitals  No vitals data found for the desired time range.          Post Anesthesia Care and Evaluation    Patient location during evaluation: PACU  Patient participation: complete - patient participated  Level of consciousness: awake  Pain score: 0  Pain management: adequate    Airway patency: patent  Anesthetic complications: No anesthetic complications  PONV Status: controlled  Cardiovascular status: acceptable and stable  Respiratory status: acceptable and room air  Hydration status: acceptable    Comments: Vital signs as noted in nursing documentation as per protocol

## 2022-07-09 NOTE — PLAN OF CARE
Problem: Adult Inpatient Plan of Care  Goal: Plan of Care Review  Outcome: Met  Goal: Patient-Specific Goal (Individualized)  Outcome: Met  Goal: Absence of Hospital-Acquired Illness or Injury  Outcome: Met  Intervention: Identify and Manage Fall Risk  Recent Flowsheet Documentation  Taken 7/9/2022 1000 by Pedro Luis Murphy RN  Safety Promotion/Fall Prevention: patient off unit  Taken 7/9/2022 0800 by Pedro Luis Murphy RN  Safety Promotion/Fall Prevention: patient off unit  Intervention: Prevent Skin Injury  Recent Flowsheet Documentation  Taken 7/9/2022 0800 by Pedro Luis Murphy RN  Body Position: foot of bed elevated  Intervention: Prevent and Manage VTE (Venous Thromboembolism) Risk  Recent Flowsheet Documentation  Taken 7/9/2022 0800 by Pedro Luis Murphy RN  Activity Management: dorsiflexion/plantar flexion performed  VTE Prevention/Management: sequential compression devices off  Range of Motion: active ROM (range of motion) encouraged  Goal: Optimal Comfort and Wellbeing  Outcome: Met  Intervention: Monitor Pain and Promote Comfort  Recent Flowsheet Documentation  Taken 7/9/2022 0800 by Pedro Luis Murphy RN  Pain Management Interventions: pain management plan reviewed with patient/caregiver  Intervention: Provide Person-Centered Care  Recent Flowsheet Documentation  Taken 7/9/2022 0800 by Pedro Luis Murphy RN  Trust Relationship/Rapport: care explained  Goal: Readiness for Transition of Care  Outcome: Met     Problem: Anemia  Goal: Anemia Symptom Improvement  Outcome: Met  Intervention: Monitor and Manage Anemia  Recent Flowsheet Documentation  Taken 7/9/2022 1000 by Pedro Luis Murphy RN  Safety Promotion/Fall Prevention: patient off unit  Taken 7/9/2022 0800 by Pedro Luis Murphy RN  Safety Promotion/Fall Prevention: patient off unit   Goal Outcome Evaluation:   Pt being discharged

## 2022-07-09 NOTE — OUTREACH NOTE
Prep Survey    Flowsheet Row Responses   [facility] patient discharged from? [facility]   Is LACE score < 7 ? Yes   Emergency Room discharge w/ pulse ox? No   Eligibility [facility]   Date of Admission 07/08/22   Date of Discharge 07/09/22   Discharge Disposition Home or Self Care   Discharge diagnosis anemia with Hgb upon admission 5.6,  EGD with biopsy,  Acute GI bleed   Does the patient have one of the following disease processes/diagnoses(primary or secondary)? Other   Does the patient have Home health ordered? No   Is there a DME ordered? No   Prep survey completed? Yes          IGLESIA OGLESBY - Registered Nurse

## 2022-07-10 LAB
BH BB BLOOD EXPIRATION DATE: NORMAL
BH BB BLOOD TYPE BARCODE: 5100
BH BB DISPENSE STATUS: NORMAL
BH BB PRODUCT CODE: NORMAL
BH BB UNIT NUMBER: NORMAL
CROSSMATCH INTERPRETATION: NORMAL
UNIT  ABO: NORMAL
UNIT  RH: NORMAL

## 2022-07-11 ENCOUNTER — TRANSITIONAL CARE MANAGEMENT TELEPHONE ENCOUNTER (OUTPATIENT)
Dept: CALL CENTER | Facility: HOSPITAL | Age: 57
End: 2022-07-11

## 2022-07-11 DIAGNOSIS — K44.9 PARAESOPHAGEAL HERNIA: Primary | ICD-10-CM

## 2022-07-11 NOTE — OUTREACH NOTE
Call Center TCM Note    Flowsheet Row Responses   [facility] patient discharged from? [facility]   Does the patient have one of the following disease processes/diagnoses(primary or secondary)? Other   TCM attempt successful? No   Revoked Reason Other  [Patient is seeing a non [facility] PCP]          Ashlee Tang RN    7/11/2022, 10:47 EDT

## 2022-07-12 ENCOUNTER — LAB (OUTPATIENT)
Dept: LAB | Facility: HOSPITAL | Age: 57
End: 2022-07-12

## 2022-07-12 DIAGNOSIS — K44.9 PARAESOPHAGEAL HERNIA: ICD-10-CM

## 2022-07-12 LAB — REF LAB TEST METHOD: NORMAL

## 2022-07-12 PROCEDURE — C9803 HOPD COVID-19 SPEC COLLECT: HCPCS

## 2022-07-25 ENCOUNTER — LAB (OUTPATIENT)
Dept: LAB | Facility: HOSPITAL | Age: 57
End: 2022-07-25

## 2022-07-25 LAB — SARS-COV-2 RNA PNL SPEC NAA+PROBE: NOT DETECTED

## 2022-07-25 PROCEDURE — U0004 COV-19 TEST NON-CDC HGH THRU: HCPCS

## 2022-07-25 PROCEDURE — C9803 HOPD COVID-19 SPEC COLLECT: HCPCS

## 2022-07-27 ENCOUNTER — HOSPITAL ENCOUNTER (OUTPATIENT)
Dept: GENERAL RADIOLOGY | Facility: HOSPITAL | Age: 57
Discharge: HOME | End: 2022-07-27
Admitting: INTERNAL MEDICINE

## 2022-07-27 DIAGNOSIS — K25.3 ACUTE CAMERON ULCER: ICD-10-CM

## 2022-07-27 DIAGNOSIS — K44.9 PARAESOPHAGEAL HERNIA: ICD-10-CM

## 2022-07-27 PROCEDURE — 74220 X-RAY XM ESOPHAGUS 1CNTRST: CPT

## 2022-08-09 ENCOUNTER — TELEPHONE (OUTPATIENT)
Dept: GASTROENTEROLOGY | Facility: CLINIC | Age: 57
End: 2022-08-09

## 2022-08-09 RX ORDER — PANTOPRAZOLE SODIUM 40 MG/1
40 TABLET, DELAYED RELEASE ORAL DAILY
Qty: 30 TABLET | Refills: 3 | Status: SHIPPED | OUTPATIENT
Start: 2022-08-09

## 2022-08-09 NOTE — TELEPHONE ENCOUNTER
----- Message from Alka Corral MA sent at 8/9/2022  3:07 PM EDT -----  Patient saw Dr. Brambila in the hospital in July and has f/u with you in September. He was placed on Pantoprazole which was ordered by the hospitalist but was only given a 30 day supply. Wants to see if you can send in refill to [facility] on [facility].

## 2022-08-31 ENCOUNTER — TRANSCRIBE ORDERS (OUTPATIENT)
Dept: ADMINISTRATIVE | Facility: HOSPITAL | Age: 57
End: 2022-08-31

## 2022-08-31 DIAGNOSIS — K44.9 DIAPHRAGMATIC HERNIA WITHOUT OBSTRUCTION AND WITHOUT GANGRENE: Primary | ICD-10-CM

## 2022-09-08 ENCOUNTER — HOSPITAL ENCOUNTER (OUTPATIENT)
Facility: HOSPITAL | Age: 57
Setting detail: HOSPITAL OUTPATIENT SURGERY
Discharge: HOME | End: 2022-09-08
Attending: SURGERY | Admitting: SURGERY

## 2022-09-08 PROCEDURE — 91010 ESOPHAGUS MOTILITY STUDY: CPT | Performed by: SURGERY

## 2022-09-08 RX ORDER — LIDOCAINE HYDROCHLORIDE 20 MG/ML
SOLUTION OROPHARYNGEAL AS NEEDED
Status: DISCONTINUED | OUTPATIENT
Start: 2022-09-08 | End: 2022-09-08 | Stop reason: HOSPADM

## 2022-09-08 NOTE — NURSING NOTE
Patient tolerated manometry without complication. Probe placed at 43. Did have difficulty getting upper LES and stomach on screen

## 2022-09-14 ENCOUNTER — OFFICE VISIT (OUTPATIENT)
Dept: GASTROENTEROLOGY | Facility: CLINIC | Age: 57
End: 2022-09-14

## 2022-09-14 ENCOUNTER — LAB (OUTPATIENT)
Dept: LAB | Facility: HOSPITAL | Age: 57
End: 2022-09-14

## 2022-09-14 VITALS
DIASTOLIC BLOOD PRESSURE: 62 MMHG | HEIGHT: 71 IN | SYSTOLIC BLOOD PRESSURE: 112 MMHG | TEMPERATURE: 98.4 F | WEIGHT: 202 LBS | BODY MASS INDEX: 28.28 KG/M2

## 2022-09-14 DIAGNOSIS — K57.30 DIVERTICULOSIS OF COLON: ICD-10-CM

## 2022-09-14 DIAGNOSIS — K25.9 GASTRIC EROSION, UNSPECIFIED CHRONICITY: ICD-10-CM

## 2022-09-14 DIAGNOSIS — K44.9 HIATAL HERNIA: ICD-10-CM

## 2022-09-14 DIAGNOSIS — K44.9 PARAESOPHAGEAL HERNIA: Primary | ICD-10-CM

## 2022-09-14 DIAGNOSIS — K25.9 GASTRIC ULCER, UNSPECIFIED CHRONICITY, UNSPECIFIED WHETHER GASTRIC ULCER HEMORRHAGE OR PERFORATION PRESENT: ICD-10-CM

## 2022-09-14 DIAGNOSIS — Z12.11 COLON CANCER SCREENING: ICD-10-CM

## 2022-09-14 DIAGNOSIS — D50.0 IRON DEFICIENCY ANEMIA DUE TO CHRONIC BLOOD LOSS: ICD-10-CM

## 2022-09-14 DIAGNOSIS — D50.9 IRON DEFICIENCY ANEMIA, UNSPECIFIED IRON DEFICIENCY ANEMIA TYPE: ICD-10-CM

## 2022-09-14 LAB
IRON 24H UR-MRATE: 44 MCG/DL (ref 59–158)
IRON SATN MFR SERPL: 10 % (ref 20–50)
TIBC SERPL-MCNC: 423 MCG/DL (ref 298–536)
TRANSFERRIN SERPL-MCNC: 284 MG/DL (ref 200–360)

## 2022-09-14 PROCEDURE — 85027 COMPLETE CBC AUTOMATED: CPT

## 2022-09-14 PROCEDURE — 82607 VITAMIN B-12: CPT

## 2022-09-14 PROCEDURE — 99214 OFFICE O/P EST MOD 30 MIN: CPT | Performed by: PHYSICIAN ASSISTANT

## 2022-09-14 PROCEDURE — 36415 COLL VENOUS BLD VENIPUNCTURE: CPT

## 2022-09-14 PROCEDURE — 82746 ASSAY OF FOLIC ACID SERUM: CPT

## 2022-09-14 PROCEDURE — 84466 ASSAY OF TRANSFERRIN: CPT

## 2022-09-14 PROCEDURE — 83540 ASSAY OF IRON: CPT

## 2022-09-14 RX ORDER — ROPINIROLE 0.5 MG/1
0.5 TABLET, FILM COATED ORAL
COMMUNITY
Start: 2022-06-24 | End: 2022-09-14

## 2022-09-15 ENCOUNTER — HOSPITAL ENCOUNTER (OUTPATIENT)
Dept: NUCLEAR MEDICINE | Facility: HOSPITAL | Age: 57
Discharge: HOME | End: 2022-09-15

## 2022-09-15 DIAGNOSIS — K44.9 DIAPHRAGMATIC HERNIA WITHOUT OBSTRUCTION AND WITHOUT GANGRENE: ICD-10-CM

## 2022-09-15 LAB
DEPRECATED RDW RBC AUTO: 60.2 FL (ref 37–54)
ERYTHROCYTE [DISTWIDTH] IN BLOOD BY AUTOMATED COUNT: 20 % (ref 12.3–15.4)
FOLATE SERPL-MCNC: 17.3 NG/ML (ref 4.78–24.2)
HCT VFR BLD AUTO: 40.5 % (ref 37.5–51)
HGB BLD-MCNC: 13.2 G/DL (ref 13–17.7)
MCH RBC QN AUTO: 27.2 PG (ref 26.6–33)
MCHC RBC AUTO-ENTMCNC: 32.6 G/DL (ref 31.5–35.7)
MCV RBC AUTO: 83.5 FL (ref 79–97)
PLATELET # BLD AUTO: 243 10*3/MM3 (ref 140–450)
PMV BLD AUTO: 10.8 FL (ref 6–12)
RBC # BLD AUTO: 4.85 10*6/MM3 (ref 4.14–5.8)
VIT B12 BLD-MCNC: 354 PG/ML (ref 211–946)
WBC NRBC COR # BLD: 8.4 10*3/MM3 (ref 3.4–10.8)

## 2022-09-15 PROCEDURE — 0 TECHNETIUM SULFUR COLLOID: Performed by: SURGERY

## 2022-09-15 PROCEDURE — 78264 GASTRIC EMPTYING IMG STUDY: CPT

## 2022-09-15 PROCEDURE — A9541 TC99M SULFUR COLLOID: HCPCS | Performed by: SURGERY

## 2022-09-15 RX ADMIN — TECHNETIUM TC 99M SULFUR COLLOID 1 DOSE: KIT at 08:00

## 2022-09-16 PROCEDURE — 88305 TISSUE EXAM BY PATHOLOGIST: CPT | Performed by: SURGERY

## 2022-09-19 ENCOUNTER — LAB REQUISITION (OUTPATIENT)
Dept: LAB | Facility: HOSPITAL | Age: 57
End: 2022-09-19

## 2022-09-19 DIAGNOSIS — K44.9 DIAPHRAGMATIC HERNIA WITHOUT OBSTRUCTION OR GANGRENE: ICD-10-CM

## 2022-09-20 LAB
CYTO UR: NORMAL
LAB AP CASE REPORT: NORMAL
LAB AP CLINICAL INFORMATION: NORMAL
PATH REPORT.FINAL DX SPEC: NORMAL
PATH REPORT.GROSS SPEC: NORMAL

## 2022-10-04 NOTE — PROGRESS NOTES
Follow Up Note     Date: 2022   Patient Name: Franky Mccall  MRN: [id number]  : 1965     Primary Care Provider: Abel Sweeney DO     Chief Complaint   Patient presents with   • Paraesophageal hernia     History of present illness:   2022  Franky Mccall is a 57 y.o. male who is here today for follow up regarding Paraesophageal hernia.    He was hospitalized in 2022 due to anemia found on labs. This was a new diagnosis for him. He had been somewhat short of breath with exertion prior as well as had been taking several TUMs each night. He had no significant past medical history and had been otherwise well. Since hospital discharge, he took iron supplement until he ran out. Has continued to take Protonix 40 mg once daily as directed. He has no complaints now, no reflux, abdominal pain, nausea, vomiting. Appetite is good. He is eating without difficulty. No dysphagia. He saw Dr. Juarez for consultation, had manometry last week and has upcoming gastric emptying test next week.     Interval History:  2022  This is a 57-year-old male patient with no significant medical problems was brought into emergency room this morning with complaints of progressive shortness of breathing, generalized weakness fatigue since about 4 to 6 weeks time.     He does not have any primary care physician currently.  He has been noticing a lot of fatigue and mild shortness of breathing after his regular jogging since about 4 to 6 weeks.  As his symptoms did not improve his a sister who is a advanced nurse practitioner neurology advised him to get blood work done and had his labs done yesterday which showed a severe anemia with a hemoglobin of 5 g/dL for which he was sent to emergency room.     He denies any abdominal pain no nausea vomiting.  He has been having some issues with indigestion for which he was taking Tums.  Been having regular bowel movement without any diarrhea or constipation.  He  "Pt's grandmother calling on her behalf, she is at work now and the patient is at school.  She states Ne has been having stomach aches in the am, and occasionally gagging and vomiting in the mornings.  Ne told her that several weeks ago she saw a "maggot" in her emesis and described it was moving around.  This am she woke up and said there was another "maggot" on her pillow.  No appts available in clinic, I advised I would reach out to Dr. Elliott to see if she could be fit into the schedule today, or she could go to the [facility] [place].  I also offered an [facility] virtual visit or [facility] home visit.  She will wait for response from Dr. Elliott or bring her in to the [facility] later today.      Reason for Disposition   [1] Caller is not with the child AND [2] is reporting urgent symptoms   Passed a "worm" by rectum (Exception: clear beads from diaper filler)     Vomited several weeks ago and saw a "maggot" in her emesis, moving around.  Found a "maggot" on her pillow this am.  Has been having stomach aches primarily in the am, and occasionally gagging and vomiting    Additional Information   Negative: [1] Vomited 2 or more times AND [2] passed a large worm (8-10 inches) recently     Describes the parasite as looking like a maggot   Negative: Child sounds very sick or weak to the triager   Negative: White, threadlike 1/4 to 1/2 inch worm (6 to 12 mm)   Negative: Passed a large worm (8-10 inches) by mouth or nose    Protocols used: Worms - Other Than Glddtjur-M-SU, Information Only Call - No Triage-P-AH    " did not see any black stool or blood in the stool or melena.  However in the emergency room today as per ED staff, rectal examination done reveals some dark stool and fecal occult blood test was positive.  He denies any prior history of peptic ulcer disease.  Denies any over-the-counter NSAID use.  No prior endoscopy.  Last colonoscopy was about 5 years ago and had a polyps removed and advised to repeat the colonoscopy in 5 years time.  Colonoscopy report not available.  No family history of any colon cancer or GI malignancy.     In the emergency room today he was noted to have a hemoglobin of 5.6 g/dL with a iron deficiency.  He also had a CT abdomen pelvis done which revealed a large hiatal hernia with almost entire stomach located in the chest.  GI was called for further evaluation and management.    Subjective     Past Medical History:   Diagnosis Date   • GERD (gastroesophageal reflux disease)      Past Surgical History:   Procedure Laterality Date   • COLONOSCOPY N/A 7/9/2022    Procedure: COLONOSCOPY;  Surgeon: Mónica Brambila MD;  Location: [facility] ENDOSCOPY;  Service: Gastroenterology;  Laterality: N/A;   • ENDOSCOPY N/A 7/9/2022    Procedure: ESOPHAGOGASTRODUODENOSCOPY with biopsy;  Surgeon: Mónica Brambila MD;  Location: [facility] ENDOSCOPY;  Service: Gastroenterology;  Laterality: N/A;   • ESOPHAGEAL MOTILITY STUDY N/A 9/8/2022    Procedure: MANOMETRY;  Surgeon: Faisal Juarez MD;  Location: [facility] ENDOSCOPY;  Service: General;  Laterality: N/A;     Family History   Problem Relation Age of Onset   • Colon cancer Neg Hx      Social History     Socioeconomic History   • Marital status:    Tobacco Use   • Smoking status: Never Smoker   • Smokeless tobacco: Never Used   Vaping Use   • Vaping Use: Never used   Substance and Sexual Activity   • Alcohol use: Yes   • Drug use: No     Comment: social   • Sexual activity: Defer     Current Outpatient Medications:   •  pantoprazole (Protonix)  40 MG EC tablet, Take 1 tablet by mouth Daily., Disp: 30 tablet, Rfl: 3    No Known Allergies    The following portions of the patient's history were reviewed and updated as appropriate: allergies, current medications, past family history, past medical history, past social history, past surgical history and problem list.    Objective     Physical Exam  Vitals reviewed.   Constitutional:       General: He is not in acute distress.     Appearance: Normal appearance. He is well-developed. He is not ill-appearing or diaphoretic.   HENT:      Head: Normocephalic and atraumatic.      Right Ear: External ear normal.      Left Ear: External ear normal.      Nose: Nose normal.      Mouth/Throat:      Comments: Wearing a mask  Eyes:      General: No scleral icterus.        Right eye: No discharge.         Left eye: No discharge.      Conjunctiva/sclera: Conjunctivae normal.   Neck:      Vascular: No JVD.   Cardiovascular:      Rate and Rhythm: Normal rate and regular rhythm.      Heart sounds: Normal heart sounds. No murmur heard.    No friction rub. No gallop.   Pulmonary:      Effort: Pulmonary effort is normal. No respiratory distress.      Breath sounds: Normal breath sounds. No wheezing or rales.   Chest:      Chest wall: No tenderness.   Abdominal:      General: Bowel sounds are normal. There is no distension.      Palpations: Abdomen is soft. There is no mass.      Tenderness: There is no abdominal tenderness. There is no guarding.   Musculoskeletal:         General: No deformity. Normal range of motion.      Cervical back: Normal range of motion.   Skin:     General: Skin is warm and dry.      Findings: No erythema or rash.   Neurological:      Mental Status: He is alert and oriented to person, place, and time.      Coordination: Coordination normal.   Psychiatric:         Mood and Affect: Mood normal.         Behavior: Behavior normal.         Thought Content: Thought content normal.         Judgment: Judgment  "normal.       Vitals:    09/14/22 1455   BP: 112/62   Temp: 98.4 °F (36.9 °C)   Weight: 91.6 kg (202 lb)   Height: 180.3 cm (71\")       Results Review:   I reviewed the patient's new clinical results.    • Hemoglobin 07/08/2022 5.6 (A) 13.0 - 17.7 g/dL Final   • Hematocrit 07/08/2022 20.4 (A) 37.5 - 51.0 % Final   Lab on 07/07/2022   Component Date Value Ref Range Status   • TSH 07/07/2022 2.750  0.270 - 4.200 uIU/mL Final   • Glucose 07/07/2022 143 (A) 65 - 99 mg/dL Final   • BUN 07/07/2022 15  6 - 20 mg/dL Final   • Creatinine 07/07/2022 1.09  0.76 - 1.27 mg/dL Final   • Sodium 07/07/2022 138  136 - 145 mmol/L Final   • Potassium 07/07/2022 4.1  3.5 - 5.2 mmol/L Final   • Chloride 07/07/2022 106  98 - 107 mmol/L Final   • CO2 07/07/2022 25.5  22.0 - 29.0 mmol/L Final   • Calcium 07/07/2022 8.6  8.6 - 10.5 mg/dL Final   • Total Protein 07/07/2022 6.7  6.0 - 8.5 g/dL Final   • Albumin 07/07/2022 4.10  3.50 - 5.20 g/dL Final   • ALT (SGPT) 07/07/2022 16  1 - 41 U/L Final   • AST (SGOT) 07/07/2022 16  1 - 40 U/L Final   • Alkaline Phosphatase 07/07/2022 53  39 - 117 U/L Final   • Total Bilirubin 07/07/2022 0.2  0.0 - 1.2 mg/dL Final   • Globulin 07/07/2022 2.6  gm/dL Final   • A/G Ratio 07/07/2022 1.6  g/dL Final   • BUN/Creatinine Ratio 07/07/2022 13.8  7.0 - 25.0 Final   • Anion Gap 07/07/2022 6.5  5.0 - 15.0 mmol/L Final   • eGFR 07/07/2022 79.7  >60.0 mL/min/1.73 Final    National Kidney Foundation and American Society of Nephrology (ASN) Task Force recommended calculation based on the Chronic Kidney Disease Epidemiology Collaboration (CKD-EPI) equation refit without adjustment for race.   • Iron 07/07/2022 8 (A) 59 - 158 mcg/dL Final   • Iron Saturation 07/07/2022 2 (A) 20 - 50 % Final   • Transferrin 07/07/2022 316  200 - 360 mg/dL Final   • TIBC 07/07/2022 471  298 - 536 mcg/dL Final   • WBC 07/07/2022 6.20  3.40 - 10.80 10*3/mm3 Final   • RBC 07/07/2022 2.93 (A) 4.14 - 5.80 10*6/mm3 Final   • Hemoglobin " 07/07/2022 5.7 (A) 13.0 - 17.7 g/dL Final   • Hematocrit 07/07/2022 21.0 (A) 37.5 - 51.0 % Final   • MCV 07/07/2022 71.7 (A) 79.0 - 97.0 fL Final   • MCH 07/07/2022 19.5 (A) 26.6 - 33.0 pg Final   • MCHC 07/07/2022 27.1 (A) 31.5 - 35.7 g/dL Final   • RDW 07/07/2022 15.2  12.3 - 15.4 % Final   • RDW-SD 07/07/2022 38.8  37.0 - 54.0 fl Final   • MPV 07/07/2022 10.7  6.0 - 12.0 fL Final   • Platelets 07/07/2022 375  140 - 450 10*3/mm3 Final      CT Abdomen Pelvis With Contrast  Result Date: 7/8/2022  Large hiatal hernia.         FL Esophagram Complete Single Contrast  Result Date: 7/27/2022  Massive paraesophageal hernia.    EGD and colonoscopy were completed on 7/9/2022 by Dr. Brambila:  - The oropharynx was normal.  - The Z-line was regular and was found 39 cm from the incisors.  - No gross lesions were noted in the entire esophagus.  - A large paraesophageal hernia/ moderate sliding hiatal hernia was found.  - One non-bleeding superficial gastric ulcer with no stigmata of bleeding was found in the gastric fundus. The lesion was 5 mm in largest dimension.  - A few dispersed small erosions with no stigmata of recent bleeding were found in the gastric fundus. Biopsies were taken with a cold forceps for Helicobacter pylori testing.  - The duodenal bulb, first portion of the duodenum and second portion of the duodenum were normal.  - Unable to pass the gastroscope beyond pylorus due to significant deformed, abnormal anatomy from large paraesophgeal hernia. Gastroscope was removed and colonoscope was passed and procedure completed.  - The perianal and digital rectal examinations were normal.  - A few small and large-mouthed diverticula were found in the sigmoid colon and descending colon.  - Non-bleeding external and internal hemorrhoids were found. The hemorrhoids were mild and small.  - No signs of any lower GI bleeding  - The terminal ileum appeared normal.  Pathology DIAGNOSIS:   ANTRUM AND BODY, BIOPSY:   Gastric  mucosa with no significant histopathologic abnormality   No H. pylori organisms identified on routine stains   Negative for intestinal metaplasia, dysplasia, or carcinoma  No colonic specimens collected.     Assessment / Plan      1. Paraesophageal hernia  2. Gastric erosion, unspecified chronicity  3. Gastric ulcer, unspecified chronicity, unspecified whether gastric ulcer hemorrhage or perforation present  4. Iron deficiency anemia, chronic blood loss  He had presented to the hospital after labs revealed significant anemia. He had noticed gradually worsening fatigue and SOA which prompted him to have labs. He has no prior history of anemia. No history of vomiting. He had noticed some reflux recently and had been taking a large amount of TUMs. He has had EGD showing large paraesophageal hernia with gastric erosion and ulcer. Esophagram confirmed this. He has already seen surgeon, Dr. Juarez and planning for likely hernia repair surgery. He took iron supplements as directed until he ran out. Hgb last checked and 6.6 g/dL on 7/9/2022. Colonoscopy was completed at the time of his EGD and no lower GI bleeding was found.     Patient likely has chronic upper GI bleeding  Labs today, will call with results  Continue PPI daily  Avoid NSAIDs  Will determine need for more iron with labs, he took all he had  Keep appts with Dr. Juarez for likely hiatal hernia repair    - CBC (No Diff); Future  - Iron Profile; Future  - Vitamin B12; Future  - Folate; Future    5. Diverticulosis of colon  Diverticulosis was noted on recent colonoscopy, he was instructed to increase dietary fiber intake to 25-45g daily and a list of fiber foods was given. He has agreed to try to increase daily water intake and daily exercise as well.     6. Colon cancer screening  Colonoscopy 7/2022 did not reveal any colon polyps. He will need a repeat colonoscopy in 10 years, due 2032.   Prior colonoscopy approx 5 years ago, details unknown. There is no known  family history of colon cancer or colon polyps.          Follow Up:   Return if symptoms worsen or fail to improve.      Nelia Ley PA-C  [facility] [place]  9/26/2022  09:00 EDT    Dictated Utilizing Dragon Dictation: Part of this note may be an electronic transcription/translation of spoken language to printed text using the Dragon Dictation System.

## 2022-11-28 ENCOUNTER — PROCEDURE VISIT (OUTPATIENT)
Dept: UROLOGY | Facility: CLINIC | Age: 57
End: 2022-11-28

## 2022-11-28 DIAGNOSIS — N43.3 HYDROCELE, UNSPECIFIED HYDROCELE TYPE: Primary | ICD-10-CM

## 2022-11-28 PROCEDURE — 76998 US GUIDE INTRAOP: CPT | Performed by: UROLOGY

## 2022-11-28 PROCEDURE — 55000 DRAINAGE OF HYDROCELE: CPT | Performed by: UROLOGY

## 2022-11-28 NOTE — PROGRESS NOTES
Preoperative diagnosis  Hydrocele    Postoperative diagnosis  Same    Procedure performed  Hydrocele drainage with ultrasound guidance    Surgeon  Madi Dominguez MD    Anesthesia  5 mL lidocaine 2% local injection    Complications  None    EBL  2 mL    Specimen  Left vas  Right vas    Indications  59 y.o. male agreed to undergo the above named procedure after discussion of the alternatives, risks and benefits.  Informed consent was obtained.      Procedure  The patient was brought to the procedure room and placed in supine position.  His scrotum was prepped with Hibiclens and he was draped in a sterile fashion.  A timeout was performed.  He was given oral antibiotic for prophylaxis.    We began the procedure with ultrasound identification of the hydrocele area.  A small amount of local anesthetic was instilled of the anterior scrotum and a wheal.  We then placed an 18-gauge needle through it and into the hydrocele sac under ultrasound guidance.  The entirety of the hydrocele was then drained.  Total volume drained was 180 cc.  There was minimal bleeding.  The patient tolerated the procedure well.

## 2022-12-06 ENCOUNTER — PRE-ADMISSION TESTING (OUTPATIENT)
Dept: PREADMISSION TESTING | Facility: HOSPITAL | Age: 57
End: 2022-12-06

## 2022-12-06 VITALS — BODY MASS INDEX: 29.6 KG/M2 | WEIGHT: 206.79 LBS | HEIGHT: 70 IN

## 2022-12-06 LAB
ANION GAP SERPL CALCULATED.3IONS-SCNC: 9 MMOL/L (ref 5–15)
BUN SERPL-MCNC: 16 MG/DL (ref 6–20)
BUN/CREAT SERPL: 16.7 (ref 7–25)
CALCIUM SPEC-SCNC: 9.4 MG/DL (ref 8.6–10.5)
CHLORIDE SERPL-SCNC: 106 MMOL/L (ref 98–107)
CO2 SERPL-SCNC: 25 MMOL/L (ref 22–29)
CREAT SERPL-MCNC: 0.96 MG/DL (ref 0.76–1.27)
DEPRECATED RDW RBC AUTO: 44.6 FL (ref 37–54)
EGFRCR SERPLBLD CKD-EPI 2021: 92.8 ML/MIN/1.73
ERYTHROCYTE [DISTWIDTH] IN BLOOD BY AUTOMATED COUNT: 13.2 % (ref 12.3–15.4)
GLUCOSE SERPL-MCNC: 95 MG/DL (ref 65–99)
HCT VFR BLD AUTO: 45 % (ref 37.5–51)
HGB BLD-MCNC: 14.9 G/DL (ref 13–17.7)
MCH RBC QN AUTO: 30.3 PG (ref 26.6–33)
MCHC RBC AUTO-ENTMCNC: 33.1 G/DL (ref 31.5–35.7)
MCV RBC AUTO: 91.6 FL (ref 79–97)
PLATELET # BLD AUTO: 262 10*3/MM3 (ref 140–450)
PMV BLD AUTO: 9.5 FL (ref 6–12)
POTASSIUM SERPL-SCNC: 4.6 MMOL/L (ref 3.5–5.2)
RBC # BLD AUTO: 4.91 10*6/MM3 (ref 4.14–5.8)
SODIUM SERPL-SCNC: 140 MMOL/L (ref 136–145)
WBC NRBC COR # BLD: 8.5 10*3/MM3 (ref 3.4–10.8)

## 2022-12-06 PROCEDURE — 85027 COMPLETE CBC AUTOMATED: CPT

## 2022-12-06 PROCEDURE — 80048 BASIC METABOLIC PNL TOTAL CA: CPT

## 2022-12-06 PROCEDURE — 36415 COLL VENOUS BLD VENIPUNCTURE: CPT

## 2022-12-06 PROCEDURE — 93010 ELECTROCARDIOGRAM REPORT: CPT | Performed by: INTERNAL MEDICINE

## 2022-12-06 PROCEDURE — 93005 ELECTROCARDIOGRAM TRACING: CPT

## 2022-12-06 NOTE — PAT
An arrival time for procedure was not provided during PAT visit. If patient had any questions or concerns about their arrival time, they were instructed to contact their surgeon/physician.  Additionally, if the patient referred to an arrival time that was acquired from their my chart account, patient was encouraged to verify that time with their surgeon/physician. Arrival times are NOT provided in Pre Admission Testing Department.    Patient viewed general PAT education video as instructed in their preoperative information received from their surgeon.  Patient stated the general PAT education video was viewed in its entirety and survey completed.  Copies of PAT general education handouts (Incentive Spirometry, Meds to Beds Program, Patient Belongings, Pre-op skin preparation instructions, Blood Glucose testing, Visitor policy, Surgery FAQ, Code H) distributed to patient if not printed. Education related to the PAT pass and skin preparation for surgery (if applicable) completed in PAT as a reinforcement to PAT education video. Patient instructed to return PAT pass provided today as well as completed skin preparation sheet (if applicable) on the day of procedure.     Additionally if patient had not viewed video yet but intended to view it at home or in our waiting area, then referred them to the handout with QR code/link provided during PAT visit.  Instructed patient to complete survey after viewing the video in its entirety.  Encouraged patient/family to read PAT general education handouts thoroughly and notify PAT staff with any questions or concerns. Patient verbalized understanding of all information and priority content.    Patient to apply Chlorhexadine wipes  to surgical area (as instructed) the night before procedure and the AM of procedure. Wipes provided.    Patient denies any current skin issues.

## 2022-12-08 LAB
QT INTERVAL: 346 MS
QTC INTERVAL: 401 MS

## 2022-12-12 ENCOUNTER — ANESTHESIA EVENT (OUTPATIENT)
Dept: PERIOP | Facility: HOSPITAL | Age: 57
End: 2022-12-12

## 2022-12-12 RX ORDER — FAMOTIDINE 10 MG/ML
20 INJECTION, SOLUTION INTRAVENOUS ONCE
Status: CANCELLED | OUTPATIENT
Start: 2022-12-12 | End: 2022-12-12

## 2022-12-12 RX ORDER — SODIUM CHLORIDE 0.9 % (FLUSH) 0.9 %
10 SYRINGE (ML) INJECTION AS NEEDED
Status: CANCELLED | OUTPATIENT
Start: 2022-12-12

## 2022-12-12 RX ORDER — SODIUM CHLORIDE 0.9 % (FLUSH) 0.9 %
10 SYRINGE (ML) INJECTION EVERY 12 HOURS SCHEDULED
Status: CANCELLED | OUTPATIENT
Start: 2022-12-12

## 2022-12-12 RX ORDER — SODIUM CHLORIDE 9 MG/ML
40 INJECTION, SOLUTION INTRAVENOUS AS NEEDED
Status: CANCELLED | OUTPATIENT
Start: 2022-12-12

## 2022-12-13 ENCOUNTER — ANESTHESIA (OUTPATIENT)
Dept: PERIOP | Facility: HOSPITAL | Age: 57
End: 2022-12-13

## 2022-12-13 ENCOUNTER — HOSPITAL ENCOUNTER (OUTPATIENT)
Facility: HOSPITAL | Age: 57
Discharge: HOME | End: 2022-12-14
Attending: SURGERY | Admitting: SURGERY

## 2022-12-13 DIAGNOSIS — K44.9 PARAESOPHAGEAL HERNIA: Primary | ICD-10-CM

## 2022-12-13 PROCEDURE — G0378 HOSPITAL OBSERVATION PER HR: HCPCS

## 2022-12-13 PROCEDURE — 25010000002 FENTANYL CITRATE (PF) 50 MCG/ML SOLUTION

## 2022-12-13 PROCEDURE — 25010000002 HYDROMORPHONE HCL-NACL 30-0.9 MG/30ML-% SOLUTION PREFILLED SYRINGE: Performed by: SURGERY

## 2022-12-13 PROCEDURE — 25010000002 ONDANSETRON PER 1 MG: Performed by: NURSE ANESTHETIST, CERTIFIED REGISTERED

## 2022-12-13 PROCEDURE — 25010000002 FENTANYL CITRATE (PF) 100 MCG/2ML SOLUTION: Performed by: NURSE ANESTHETIST, CERTIFIED REGISTERED

## 2022-12-13 PROCEDURE — 25010000002 NEOSTIGMINE 10 MG/10ML SOLUTION: Performed by: NURSE ANESTHETIST, CERTIFIED REGISTERED

## 2022-12-13 PROCEDURE — 25010000002 HYDROMORPHONE 1 MG/ML SOLUTION

## 2022-12-13 PROCEDURE — 25010000002 DEXAMETHASONE PER 1 MG: Performed by: NURSE ANESTHETIST, CERTIFIED REGISTERED

## 2022-12-13 PROCEDURE — 88302 TISSUE EXAM BY PATHOLOGIST: CPT | Performed by: SURGERY

## 2022-12-13 PROCEDURE — 25010000002 CEFAZOLIN IN DEXTROSE 2-4 GM/100ML-% SOLUTION: Performed by: SURGERY

## 2022-12-13 PROCEDURE — C1781 MESH (IMPLANTABLE): HCPCS | Performed by: SURGERY

## 2022-12-13 PROCEDURE — S0260 H&P FOR SURGERY: HCPCS | Performed by: PHYSICIAN ASSISTANT

## 2022-12-13 PROCEDURE — 25010000002 PROPOFOL 10 MG/ML EMULSION: Performed by: NURSE ANESTHETIST, CERTIFIED REGISTERED

## 2022-12-13 DEVICE — PHASIX ST MESH, RECTANGLE
Type: IMPLANTABLE DEVICE | Site: ABDOMEN | Status: FUNCTIONAL
Brand: PHASIX ST MESH

## 2022-12-13 DEVICE — LIGACLIP 10-M/L, 10MM ENDOSCOPIC ROTATING MULTIPLE CLIP APPLIERS
Type: IMPLANTABLE DEVICE | Site: ABDOMEN | Status: FUNCTIONAL
Brand: LIGACLIP

## 2022-12-13 RX ORDER — HYDROMORPHONE HYDROCHLORIDE 1 MG/ML
0.5 INJECTION, SOLUTION INTRAMUSCULAR; INTRAVENOUS; SUBCUTANEOUS
Status: DISCONTINUED | OUTPATIENT
Start: 2022-12-13 | End: 2022-12-13 | Stop reason: HOSPADM

## 2022-12-13 RX ORDER — LANSOPRAZOLE
30 KIT DAILY
Status: DISCONTINUED | OUTPATIENT
Start: 2022-12-13 | End: 2022-12-14 | Stop reason: HOSPADM

## 2022-12-13 RX ORDER — NALOXONE HCL 0.4 MG/ML
0.1 VIAL (ML) INJECTION
Status: DISCONTINUED | OUTPATIENT
Start: 2022-12-13 | End: 2022-12-14 | Stop reason: HOSPADM

## 2022-12-13 RX ORDER — LABETALOL HYDROCHLORIDE 5 MG/ML
5 INJECTION, SOLUTION INTRAVENOUS
Status: DISCONTINUED | OUTPATIENT
Start: 2022-12-13 | End: 2022-12-13 | Stop reason: HOSPADM

## 2022-12-13 RX ORDER — CEFAZOLIN SODIUM 2 G/100ML
2 INJECTION, SOLUTION INTRAVENOUS ONCE
Status: COMPLETED | OUTPATIENT
Start: 2022-12-13 | End: 2022-12-13

## 2022-12-13 RX ORDER — DROPERIDOL 2.5 MG/ML
0.62 INJECTION, SOLUTION INTRAMUSCULAR; INTRAVENOUS
Status: DISCONTINUED | OUTPATIENT
Start: 2022-12-13 | End: 2022-12-13 | Stop reason: HOSPADM

## 2022-12-13 RX ORDER — SODIUM CHLORIDE, SODIUM LACTATE, POTASSIUM CHLORIDE, CALCIUM CHLORIDE 600; 310; 30; 20 MG/100ML; MG/100ML; MG/100ML; MG/100ML
125 INJECTION, SOLUTION INTRAVENOUS CONTINUOUS
Status: DISCONTINUED | OUTPATIENT
Start: 2022-12-13 | End: 2022-12-14

## 2022-12-13 RX ORDER — HYDROCODONE BITARTRATE AND ACETAMINOPHEN 5; 325 MG/1; MG/1
1 TABLET ORAL ONCE AS NEEDED
Status: DISCONTINUED | OUTPATIENT
Start: 2022-12-13 | End: 2022-12-13 | Stop reason: HOSPADM

## 2022-12-13 RX ORDER — LIDOCAINE HYDROCHLORIDE 10 MG/ML
INJECTION, SOLUTION EPIDURAL; INFILTRATION; INTRACAUDAL; PERINEURAL AS NEEDED
Status: DISCONTINUED | OUTPATIENT
Start: 2022-12-13 | End: 2022-12-13 | Stop reason: SURG

## 2022-12-13 RX ORDER — SODIUM CHLORIDE 0.9 % (FLUSH) 0.9 %
3 SYRINGE (ML) INJECTION EVERY 12 HOURS SCHEDULED
Status: DISCONTINUED | OUTPATIENT
Start: 2022-12-13 | End: 2022-12-13 | Stop reason: HOSPADM

## 2022-12-13 RX ORDER — BUPIVACAINE HYDROCHLORIDE AND EPINEPHRINE 2.5; 5 MG/ML; UG/ML
INJECTION, SOLUTION EPIDURAL; INFILTRATION; INTRACAUDAL; PERINEURAL AS NEEDED
Status: DISCONTINUED | OUTPATIENT
Start: 2022-12-13 | End: 2022-12-13 | Stop reason: HOSPADM

## 2022-12-13 RX ORDER — PROMETHAZINE HYDROCHLORIDE 25 MG/1
25 TABLET ORAL ONCE AS NEEDED
Status: DISCONTINUED | OUTPATIENT
Start: 2022-12-13 | End: 2022-12-13 | Stop reason: HOSPADM

## 2022-12-13 RX ORDER — ENALAPRILAT 2.5 MG/2ML
1.25 INJECTION INTRAVENOUS EVERY 6 HOURS PRN
Status: DISCONTINUED | OUTPATIENT
Start: 2022-12-13 | End: 2022-12-14 | Stop reason: HOSPADM

## 2022-12-13 RX ORDER — LIDOCAINE HYDROCHLORIDE 10 MG/ML
0.5 INJECTION, SOLUTION EPIDURAL; INFILTRATION; INTRACAUDAL; PERINEURAL ONCE AS NEEDED
Status: COMPLETED | OUTPATIENT
Start: 2022-12-13 | End: 2022-12-13

## 2022-12-13 RX ORDER — ONDANSETRON 2 MG/ML
4 INJECTION INTRAMUSCULAR; INTRAVENOUS EVERY 6 HOURS PRN
Status: DISCONTINUED | OUTPATIENT
Start: 2022-12-13 | End: 2022-12-14 | Stop reason: HOSPADM

## 2022-12-13 RX ORDER — HYDRALAZINE HYDROCHLORIDE 20 MG/ML
5 INJECTION INTRAMUSCULAR; INTRAVENOUS
Status: DISCONTINUED | OUTPATIENT
Start: 2022-12-13 | End: 2022-12-13 | Stop reason: HOSPADM

## 2022-12-13 RX ORDER — PROMETHAZINE HYDROCHLORIDE 6.25 MG/5ML
12.5 SYRUP ORAL EVERY 6 HOURS PRN
Status: DISCONTINUED | OUTPATIENT
Start: 2022-12-13 | End: 2022-12-14 | Stop reason: HOSPADM

## 2022-12-13 RX ORDER — DROPERIDOL 2.5 MG/ML
0.62 INJECTION, SOLUTION INTRAMUSCULAR; INTRAVENOUS ONCE AS NEEDED
Status: DISCONTINUED | OUTPATIENT
Start: 2022-12-13 | End: 2022-12-13 | Stop reason: HOSPADM

## 2022-12-13 RX ORDER — FAMOTIDINE 20 MG/1
20 TABLET, FILM COATED ORAL ONCE
Status: COMPLETED | OUTPATIENT
Start: 2022-12-13 | End: 2022-12-13

## 2022-12-13 RX ORDER — MIDAZOLAM HYDROCHLORIDE 1 MG/ML
1 INJECTION INTRAMUSCULAR; INTRAVENOUS
Status: DISCONTINUED | OUTPATIENT
Start: 2022-12-13 | End: 2022-12-13 | Stop reason: HOSPADM

## 2022-12-13 RX ORDER — SODIUM CHLORIDE 0.9 % (FLUSH) 0.9 %
3-10 SYRINGE (ML) INJECTION AS NEEDED
Status: DISCONTINUED | OUTPATIENT
Start: 2022-12-13 | End: 2022-12-13 | Stop reason: HOSPADM

## 2022-12-13 RX ORDER — PROMETHAZINE HYDROCHLORIDE 25 MG/1
25 SUPPOSITORY RECTAL ONCE AS NEEDED
Status: DISCONTINUED | OUTPATIENT
Start: 2022-12-13 | End: 2022-12-13 | Stop reason: HOSPADM

## 2022-12-13 RX ORDER — PROPOFOL 10 MG/ML
VIAL (ML) INTRAVENOUS AS NEEDED
Status: DISCONTINUED | OUTPATIENT
Start: 2022-12-13 | End: 2022-12-13 | Stop reason: SURG

## 2022-12-13 RX ORDER — DEXAMETHASONE SODIUM PHOSPHATE 4 MG/ML
INJECTION, SOLUTION INTRA-ARTICULAR; INTRALESIONAL; INTRAMUSCULAR; INTRAVENOUS; SOFT TISSUE AS NEEDED
Status: DISCONTINUED | OUTPATIENT
Start: 2022-12-13 | End: 2022-12-13 | Stop reason: SURG

## 2022-12-13 RX ORDER — SIMETHICONE 80 MG
80 TABLET,CHEWABLE ORAL 4 TIMES DAILY PRN
Status: DISCONTINUED | OUTPATIENT
Start: 2022-12-13 | End: 2022-12-14 | Stop reason: HOSPADM

## 2022-12-13 RX ORDER — MAGNESIUM HYDROXIDE 1200 MG/15ML
LIQUID ORAL AS NEEDED
Status: DISCONTINUED | OUTPATIENT
Start: 2022-12-13 | End: 2022-12-13 | Stop reason: HOSPADM

## 2022-12-13 RX ORDER — FENTANYL CITRATE 50 UG/ML
50 INJECTION, SOLUTION INTRAMUSCULAR; INTRAVENOUS
Status: DISCONTINUED | OUTPATIENT
Start: 2022-12-13 | End: 2022-12-13 | Stop reason: HOSPADM

## 2022-12-13 RX ORDER — GLYCOPYRROLATE 0.2 MG/ML
INJECTION INTRAMUSCULAR; INTRAVENOUS AS NEEDED
Status: DISCONTINUED | OUTPATIENT
Start: 2022-12-13 | End: 2022-12-13 | Stop reason: SURG

## 2022-12-13 RX ORDER — NALOXONE HCL 0.4 MG/ML
0.4 VIAL (ML) INJECTION AS NEEDED
Status: DISCONTINUED | OUTPATIENT
Start: 2022-12-13 | End: 2022-12-13 | Stop reason: HOSPADM

## 2022-12-13 RX ORDER — SODIUM CHLORIDE 9 MG/ML
40 INJECTION, SOLUTION INTRAVENOUS AS NEEDED
Status: DISCONTINUED | OUTPATIENT
Start: 2022-12-13 | End: 2022-12-13 | Stop reason: HOSPADM

## 2022-12-13 RX ORDER — PANTOPRAZOLE SODIUM 40 MG/1
40 TABLET, DELAYED RELEASE ORAL DAILY
Status: DISCONTINUED | OUTPATIENT
Start: 2022-12-13 | End: 2022-12-13

## 2022-12-13 RX ORDER — NEOSTIGMINE METHYLSULFATE 1 MG/ML
INJECTION, SOLUTION INTRAVENOUS AS NEEDED
Status: DISCONTINUED | OUTPATIENT
Start: 2022-12-13 | End: 2022-12-13 | Stop reason: SURG

## 2022-12-13 RX ORDER — IPRATROPIUM BROMIDE AND ALBUTEROL SULFATE 2.5; .5 MG/3ML; MG/3ML
3 SOLUTION RESPIRATORY (INHALATION) ONCE AS NEEDED
Status: DISCONTINUED | OUTPATIENT
Start: 2022-12-13 | End: 2022-12-13 | Stop reason: HOSPADM

## 2022-12-13 RX ORDER — ROCURONIUM BROMIDE 10 MG/ML
INJECTION, SOLUTION INTRAVENOUS AS NEEDED
Status: DISCONTINUED | OUTPATIENT
Start: 2022-12-13 | End: 2022-12-13 | Stop reason: SURG

## 2022-12-13 RX ORDER — ONDANSETRON 2 MG/ML
4 INJECTION INTRAMUSCULAR; INTRAVENOUS ONCE AS NEEDED
Status: DISCONTINUED | OUTPATIENT
Start: 2022-12-13 | End: 2022-12-13 | Stop reason: HOSPADM

## 2022-12-13 RX ORDER — FENTANYL CITRATE 50 UG/ML
INJECTION, SOLUTION INTRAMUSCULAR; INTRAVENOUS
Status: COMPLETED
Start: 2022-12-13 | End: 2022-12-13

## 2022-12-13 RX ORDER — DIPHENHYDRAMINE HYDROCHLORIDE 50 MG/ML
25 INJECTION INTRAMUSCULAR; INTRAVENOUS EVERY 6 HOURS PRN
Status: DISCONTINUED | OUTPATIENT
Start: 2022-12-13 | End: 2022-12-14 | Stop reason: HOSPADM

## 2022-12-13 RX ORDER — HEPARIN SODIUM 5000 [USP'U]/ML
5000 INJECTION, SOLUTION INTRAVENOUS; SUBCUTANEOUS EVERY 8 HOURS SCHEDULED
Status: DISCONTINUED | OUTPATIENT
Start: 2022-12-14 | End: 2022-12-14 | Stop reason: HOSPADM

## 2022-12-13 RX ORDER — DOCUSATE SODIUM 50 MG/5 ML
100 LIQUID (ML) ORAL DAILY
Status: DISCONTINUED | OUTPATIENT
Start: 2022-12-13 | End: 2022-12-14 | Stop reason: HOSPADM

## 2022-12-13 RX ORDER — ONDANSETRON 4 MG/1
4 TABLET, FILM COATED ORAL EVERY 6 HOURS PRN
Status: DISCONTINUED | OUTPATIENT
Start: 2022-12-13 | End: 2022-12-14 | Stop reason: HOSPADM

## 2022-12-13 RX ORDER — SODIUM CHLORIDE, SODIUM LACTATE, POTASSIUM CHLORIDE, CALCIUM CHLORIDE 600; 310; 30; 20 MG/100ML; MG/100ML; MG/100ML; MG/100ML
9 INJECTION, SOLUTION INTRAVENOUS CONTINUOUS
Status: DISCONTINUED | OUTPATIENT
Start: 2022-12-13 | End: 2022-12-13

## 2022-12-13 RX ORDER — MEPERIDINE HYDROCHLORIDE 25 MG/ML
12.5 INJECTION INTRAMUSCULAR; INTRAVENOUS; SUBCUTANEOUS
Status: DISCONTINUED | OUTPATIENT
Start: 2022-12-13 | End: 2022-12-13 | Stop reason: HOSPADM

## 2022-12-13 RX ORDER — FENTANYL CITRATE 50 UG/ML
INJECTION, SOLUTION INTRAMUSCULAR; INTRAVENOUS AS NEEDED
Status: DISCONTINUED | OUTPATIENT
Start: 2022-12-13 | End: 2022-12-13 | Stop reason: SURG

## 2022-12-13 RX ORDER — ONDANSETRON 2 MG/ML
INJECTION INTRAMUSCULAR; INTRAVENOUS AS NEEDED
Status: DISCONTINUED | OUTPATIENT
Start: 2022-12-13 | End: 2022-12-13 | Stop reason: SURG

## 2022-12-13 RX ADMIN — NEOSTIGMINE 4 MG: 1 INJECTION INTRAVENOUS at 09:49

## 2022-12-13 RX ADMIN — SODIUM CHLORIDE, POTASSIUM CHLORIDE, SODIUM LACTATE AND CALCIUM CHLORIDE 9 ML/HR: 600; 310; 30; 20 INJECTION, SOLUTION INTRAVENOUS at 07:04

## 2022-12-13 RX ADMIN — FAMOTIDINE 20 MG: 20 TABLET, FILM COATED ORAL at 07:04

## 2022-12-13 RX ADMIN — SODIUM CHLORIDE, POTASSIUM CHLORIDE, SODIUM LACTATE AND CALCIUM CHLORIDE 125 ML/HR: 600; 310; 30; 20 INJECTION, SOLUTION INTRAVENOUS at 12:44

## 2022-12-13 RX ADMIN — FENTANYL CITRATE 25 MCG: 50 INJECTION INTRAMUSCULAR; INTRAVENOUS at 09:18

## 2022-12-13 RX ADMIN — ROCURONIUM BROMIDE 20 MG: 50 INJECTION INTRAVENOUS at 09:31

## 2022-12-13 RX ADMIN — Medication: at 13:02

## 2022-12-13 RX ADMIN — LIDOCAINE HYDROCHLORIDE 50 MG: 10 INJECTION, SOLUTION EPIDURAL; INFILTRATION; INTRACAUDAL; PERINEURAL at 07:45

## 2022-12-13 RX ADMIN — LIDOCAINE HYDROCHLORIDE 0.5 ML: 10 INJECTION, SOLUTION EPIDURAL; INFILTRATION; INTRACAUDAL; PERINEURAL at 07:04

## 2022-12-13 RX ADMIN — SODIUM CHLORIDE, POTASSIUM CHLORIDE, SODIUM LACTATE AND CALCIUM CHLORIDE: 600; 310; 30; 20 INJECTION, SOLUTION INTRAVENOUS at 09:03

## 2022-12-13 RX ADMIN — PROPOFOL 50 MG: 10 INJECTION, EMULSION INTRAVENOUS at 09:19

## 2022-12-13 RX ADMIN — GLYCOPYRROLATE 0.4 MG: 0.2 INJECTION INTRAMUSCULAR; INTRAVENOUS at 09:49

## 2022-12-13 RX ADMIN — PROPOFOL 250 MG: 10 INJECTION, EMULSION INTRAVENOUS at 07:45

## 2022-12-13 RX ADMIN — SODIUM CHLORIDE, POTASSIUM CHLORIDE, SODIUM LACTATE AND CALCIUM CHLORIDE 125 ML/HR: 600; 310; 30; 20 INJECTION, SOLUTION INTRAVENOUS at 15:40

## 2022-12-13 RX ADMIN — ROCURONIUM BROMIDE 40 MG: 50 INJECTION INTRAVENOUS at 07:46

## 2022-12-13 RX ADMIN — DEXAMETHASONE SODIUM PHOSPHATE 8 MG: 4 INJECTION, SOLUTION INTRAMUSCULAR; INTRAVENOUS at 07:52

## 2022-12-13 RX ADMIN — SODIUM CHLORIDE, POTASSIUM CHLORIDE, SODIUM LACTATE AND CALCIUM CHLORIDE 125 ML/HR: 600; 310; 30; 20 INJECTION, SOLUTION INTRAVENOUS at 23:20

## 2022-12-13 RX ADMIN — PROPOFOL 50 MG: 10 INJECTION, EMULSION INTRAVENOUS at 08:20

## 2022-12-13 RX ADMIN — LANSOPRAZOLE 30 MG: KIT at 15:40

## 2022-12-13 RX ADMIN — CEFAZOLIN SODIUM 2 G: 2 INJECTION, SOLUTION INTRAVENOUS at 07:38

## 2022-12-13 RX ADMIN — FENTANYL CITRATE 50 MCG: 50 INJECTION, SOLUTION INTRAMUSCULAR; INTRAVENOUS at 10:26

## 2022-12-13 RX ADMIN — DOCUSATE SODIUM LIQUID 100 MG: 100 LIQUID ORAL at 14:00

## 2022-12-13 RX ADMIN — HYDROMORPHONE HYDROCHLORIDE 0.5 MG: 1 INJECTION, SOLUTION INTRAMUSCULAR; INTRAVENOUS; SUBCUTANEOUS at 11:03

## 2022-12-13 RX ADMIN — FENTANYL CITRATE 25 MCG: 50 INJECTION INTRAMUSCULAR; INTRAVENOUS at 08:20

## 2022-12-13 RX ADMIN — ONDANSETRON 4 MG: 2 INJECTION INTRAMUSCULAR; INTRAVENOUS at 09:46

## 2022-12-13 RX ADMIN — ROCURONIUM BROMIDE 10 MG: 50 INJECTION INTRAVENOUS at 09:00

## 2022-12-13 RX ADMIN — FENTANYL CITRATE 50 MCG: 50 INJECTION INTRAMUSCULAR; INTRAVENOUS at 07:45

## 2022-12-13 NOTE — ANESTHESIA PREPROCEDURE EVALUATION
Anesthesia Evaluation                  Airway   Mallampati: I  TM distance: >3 FB  Neck ROM: full  No difficulty expected  Dental      Pulmonary    Cardiovascular     ECG reviewed        Neuro/Psych  GI/Hepatic/Renal/Endo    (+)  hiatal hernia, GERD,      Musculoskeletal     Abdominal    Substance History      OB/GYN          Other                        Anesthesia Plan    ASA 2     general     (Taps if requested by surgeon)  intravenous induction     Anesthetic plan, risks, benefits, and alternatives have been provided, discussed and informed consent has been obtained with: patient.    Plan discussed with CRNA.        CODE STATUS:

## 2022-12-13 NOTE — PLAN OF CARE
Goal Outcome Evaluation:           Progress: improving  Outcome Evaluation: VSS. Patient on 1 L n/c, will wean off this afternoon. Lap sites x5, two with some drainage, but dressing still intact. Drained bowel contents from the PEG. Orders to clamp will come tomorrow morning, per Dr. Juarez, with the plan being to leave it in place for 6 weeks. Will educate the patient and his wife how to flush to irrigate the tube once clamped. Patient ambulated to the bathroom and has voided x2. On PCA pump, but states that he has not needed to press the button yet and that pain is well controlled. No further questions or complaints at this time.   Performed initial education on how to flush the PEG this evening.

## 2022-12-13 NOTE — PROGRESS NOTES
"Patient Name:  Franky Mccall  YOB: 1965  [id number]    Surgery Post - Operative Note    Date of visit: 12/13/2022    Subjective   Subjective: Feels Ok, pain controlled.       Objective     Objective:    /87 (BP Location: Right arm, Patient Position: Sitting)   Pulse 84   Temp 98.7 °F (37.1 °C) (Temporal)   Resp 18   Ht 180.3 cm (71\")   Wt 88.9 kg (196 lb)   SpO2 97%   BMI 27.34 kg/m²     CV:  Rate  regular and rhythm  regular  L:  Clear  to auscultation bilaterally   ABD:  Soft, appropriately tender. Dressings and PEG clean, dry and intact   EXT:  No cyanosis, clubbing or edema             Assessment/ Plan: Doing well after Lap PEH repair. Continue Pulmonary toilet    Problem List Items Addressed This Visit        Gastrointestinal Abdominal     * (Principal) Paraesophageal hernia    Relevant Orders    Tissue Pathology Exam        Active Hospital Problems    Diagnosis  POA   • **Paraesophageal hernia [K44.9]  Yes      Resolved Hospital Problems   No resolved problems to display.            Faisal Juarez MD  12/13/2022  16:46 EST    "

## 2022-12-13 NOTE — ANESTHESIA POSTPROCEDURE EVALUATION
Patient: Franky Mccall    Procedure Summary     Date: 12/13/22 Room / Location:  [facility]    Anesthesia Start: 0738 Anesthesia Stop: 1006    Procedures:       PARAESOPHAGEAL HERNIA REPAIR W/ MESH LAPAROSCOPIC, NISSEN (Abdomen)      ESOPHAGOGASTRODUODENOSCOPY WITH PERCUTANEOUS ENDOSCOPIC GASTROSTOMY TUBE INSERTION (Esophagus) Diagnosis:     Surgeons: Faisal Juarez MD Provider: Cody Garcia MD    Anesthesia Type: general ASA Status: 2          Anesthesia Type: general    Vitals  Vitals Value Taken Time   /90 12/13/22 1006   Temp 97.3 °F (36.3 °C) 12/13/22 1006   Pulse 81 12/13/22 1006   Resp 16 12/13/22 1006   SpO2 97 % 12/13/22 1006           Post Anesthesia Care and Evaluation    Patient location during evaluation: PACU  Patient participation: waiting for patient participation  Level of consciousness: sleepy but conscious    Airway patency: patent  Anesthetic complications: No anesthetic complications  PONV Status: none  Cardiovascular status: stable  Respiratory status: spontaneous ventilation and nasal cannula  Hydration status: acceptable  No anesthesia care post op

## 2022-12-13 NOTE — ANESTHESIA PROCEDURE NOTES
Airway  Urgency: elective    Date/Time: 12/13/2022 7:47 AM  Airway not difficult    General Information and Staff    Patient location during procedure: OR  CRNA/CAA: Bailey Munoz CRNA    Indications and Patient Condition  Indications for airway management: airway protection    Preoxygenated: yes  MILS not maintained throughout  Mask difficulty assessment: 1 - vent by mask    Final Airway Details  Final airway type: endotracheal airway      Successful airway: ETT  Cuffed: yes   Successful intubation technique: video laryngoscopy  Facilitating devices/methods: intubating stylet  Endotracheal tube insertion site: oral  Blade: Wesley  Blade size: 4  ETT size (mm): 7.5  Cormack-Lehane Classification: grade I - full view of glottis  Placement verified by: chest auscultation and capnometry   Measured from: lips  ETT/EBT  to lips (cm): 22  Number of attempts at approach: 1  Assessment: lips, teeth, and gum same as pre-op and atraumatic intubation    Additional Comments  Negative epigastric sounds, Breath sound equal bilaterally with symmetric chest rise and fall

## 2022-12-13 NOTE — OP NOTE
OPERATIVE NOTE    Patient Name:  Franky Mccall  YOB: 1965  [id number]    12/13/2022        PREOPERATIVE DIAGNOSIS: Paraesophageal hernia without  obstruction        POSTOPERATIVE DIAGNOSIS: Same         PROCEDURE PERFORMED:    Laparoscopic Paraesophageal Hernia Repair, Nissen fundoplication with mesh, EGD with PEG placement          SURGEON: Faisal Juarez MD       ASSISTANT: Joshua Villa MD.  Dr. Villa assisted in the retraction and visualization of structures.         SPECIMENS: Hernia sac          ANESTHESIA: General.     EBL: Minimal          FINDINGS:   1.  Type III paraesophageal hernia with intrathoracic stomach completely reduced and repaired with a posterior hiatal cruroplasty, and reinforced with a piece of Phasix ST  mesh    2.  Nissen fundoplication performed around 48 Taiwanese bougie    3. 20 Fr PEG at the 4 cm level         INDICATIONS:    Franky Mccall is a very pleasant 57 y.o. male with a history of reflux disease and paraesophageal hernia  . After a complete preoperative workup they were deemed an operative candidate. The risks and benefits were discussed at length with the patient and their family and they agreed to proceed.         DESCRIPTION OF PROCEDURE:      After obtaining informed consent, the patient was taken to the operating room and placed in supine position. After appropriate DVT and antibiotic prophylaxis, general anesthesia was induced. The abdomen was prepped and draped in standard sterile fashion.  After infiltrating the skin with local anesthetic a 12 mm skin incision was made approximately 10 cm from xiphoid process along the left costal margin. An optically guided trocar was then advanced through the abdominal wall into the abdominal cavity without difficulty. The abdomen was insufflated with carbon dioxide gas to a pressure of 15 mmHg. The laparoscope was then advanced through the trocar and the abdominal contents inspected. There was no  evidence of bowel bladder or visceral injury with entry of the trocar. At this point after infiltrating the appropriate areas with local anesthetic a standard laparoscopic Nissen fundoplication port placement schema was followed. A liver retractor was used to retract the liver anteriorly.     Using a combination of electrocautery and ultrasonic dissection the greater curvature of the stomach was mobilized up to the left dina. The pars flaccida was then divided superior and inferior to the hepatic branch of the vagus nerve which was spared throughout the procedure. The anterior surface of the right dina was then scored and using meticulous blunt dissection a retroesophageal window to the left side was created. A Penrose was placed through this and used to encircle the GE junction. A circumferential mobilization of the GE junction from the hiatus was performed using a combination of electrocautery ultrasonic and blunt dissection. This dissection was carried up into the mediastinum to the level of the aortic arch. The anterior and posterior vagus nerves were identified and spared throughout the procedure.  The right and left pleural spaces were identified and spared throughout the procedure. After complete mobilization the hernia sac was taken off the anterior surface of the stomach using ultrasonic dissection and passed off as specimen  .     A posterior hiatal cruroplasty was then performed using pledgeted 0 silk sutures. This created a snug closure of the hiatus around the esophagus. A piece of Phasix ST  mesh was then placed posteriorly, and tacked to the diaphragm using silk sutures. The anesthetist advanced a 48 Trinidadian orogastric bougie under direct visualization into the stomach. Around this bougie at the level of the GE junction a Nissen fundoplication was then performed using silk sutures. At the completion of the fundoplication, the bougie was removed, as was the Penrose drain, which was discarded.     An  "endoscope was then advanced through the mouth and the esophagus into the stomach under direct visualization. The GE junction was visualized within the fundoplication. Under maximal insufflation a retroflexed view of the GE junction was appreciated. This demonstrated an excellent \"stack of coins\" appearance to the fundoplication with good apposition of the GE junction around the scope.     At this point an appropriate site for PEG placement was determined by palpation transillumination and the safe track needle technique.  After infiltrating the skin with local anesthetic a 7 mm skin incision was made in this location.  A needle was advanced through the incision into the stomach under direct laparoscopic visualization.  A guidewire was placed through the needle and grasped with the endoscope and brought through the patient's mouth.  Using the Ponsky pull technique, a 20 Eritrean PEG tube was brought through the abdominal wall in this location.  The endoscope was then advanced back through the mouth and esophagus into the stomach where the PEG bumper could be seen abutting the anterior gastric wall in standard fashion without evidence of bleeding.  The endoscope was then used to desufflate the stomach and removed from the patient's mouth without difficulty.     The liver retractor was then removed. All trocars were then removed under direct and laparoscopic visualization and the abdomen desufflated. The incisions were then closed using running subcuticular sutures and dressed with dry sterile dressings. The PEG tube was secured to the skin at the 4 cm level, and the bumper at the 4.5 cm level. It was dressed in standard sterile fashion and placed to gravity drainage.      All sponge and needle counts were correct times two at the completion of the procedure. The patient recovered from anesthesia, was extubated in the operating room and was transported to the PACU in stable condition.    COMPLICATIONS: None "             Faisal Juarez MD  12/13/2022  10:00 EST

## 2022-12-13 NOTE — H&P
"Pre-Op H&P  Franky Mccall  [id number]  1965    Chief complaint: \"I have a hernia\"    HPI:    Patient is a 57 y.o.male who presents with a visit to emergency room 5 months ago.  He was found to have a hemoglobin of 5.6.  Heme positive stools.  He did have complaints of indigestion and epigastric burning.  He was taking Tums on a regular basis.  He denied any bowel movement problems with diarrhea or constipation.  He denied any melena.  He had had no prior endoscopy.  He had had a colonoscopy about 5 years previous which was unremarkable.  He had a CT scan in the emergency room which showed a large hiatal hernia.  Since then he has undergone a manometry.  He has also had a gastric emptying test.  Those have led to his admission today for paraesophageal hernia repair with mesh, laparoscopic, Nissen along with esophagogastroduodenoscopy and percutaneous endoscopic gastrostomy tube insertion.    Review of Systems:  General ROS: negative for chills, fever or skin lesions;  No changes since last office visit.  Neg for recent sick exposure  Cardiovascular ROS: no chest pain or dyspnea on exertion  Respiratory ROS: no cough, shortness of breath, or wheezing    Allergies: No Known Allergies    Home Meds:    No current facility-administered medications on file prior to encounter.     Current Outpatient Medications on File Prior to Encounter   Medication Sig Dispense Refill   • pantoprazole (Protonix) 40 MG EC tablet Take 1 tablet by mouth Daily. 30 tablet 3       PMH:   Past Medical History:   Diagnosis Date   • Esophageal hernia    • GERD (gastroesophageal reflux disease)    • History of transfusion     July 2022 [facility]     PSH:    Past Surgical History:   Procedure Laterality Date   • COLONOSCOPY N/A 7/9/2022    Procedure: COLONOSCOPY;  Surgeon: Mónica Brambila MD;  Location: [facility] ENDOSCOPY;  Service: Gastroenterology;  Laterality: N/A;   • ENDOSCOPY N/A 7/9/2022    Procedure: " ESOPHAGOGASTRODUODENOSCOPY with biopsy;  Surgeon: Mónica Brambila MD;  Location: [facility] ENDOSCOPY;  Service: Gastroenterology;  Laterality: N/A;   • ESOPHAGEAL MOTILITY STUDY N/A 9/8/2022    Procedure: MANOMETRY;  Surgeon: Faisal Juarez MD;  Location:  [facility]COPY;  Service: General;  Laterality: N/A;     Patient denies allergy to contrast dye or latex  Immunization History:  Influenza: No  Pneumococcal: No  Tetanus: Up-to-date    Social History:   Tobacco:   Social History     Tobacco Use   Smoking Status Never   Smokeless Tobacco Never      Alcohol:     Social History     Substance and Sexual Activity   Alcohol Use Not Currently       Vitals:           There were no vitals taken for this visit.    Physical Exam:  General Appearance:    Alert, cooperative, no distress, appears stated age   Head:    Normocephalic, without obvious abnormality, atraumatic   Lungs:    Clear to auscultation bilaterally to the bases    Heart:  S1-S2 without rubs murmurs or gallops    Abdomen:   Soft, nontender, bowel sounds present throughout.   Breast Exam:    deferred   Genitalia:    deferred   Extremities:   Extremities normal, atraumatic, no cyanosis or edema   Skin:   Skin color, texture, turgor normal, no rashes or lesions   Neurologic:   Grossly intact   Results Review  LABS:  Lab Results   Component Value Date    WBC 8.50 12/06/2022    HGB 14.9 12/06/2022    HCT 45.0 12/06/2022    MCV 91.6 12/06/2022     12/06/2022    NEUTROABS 5.10 07/08/2022    GLUCOSE 95 12/06/2022    BUN 16 12/06/2022    CREATININE 0.96 12/06/2022     12/06/2022    K 4.6 12/06/2022     12/06/2022    CO2 25.0 12/06/2022    MG 2.1 07/08/2022    PHOS 3.2 07/08/2022    CALCIUM 9.4 12/06/2022    ALBUMIN 4.10 07/08/2022    AST 13 07/08/2022    ALT 14 07/08/2022    BILITOT 0.3 07/08/2022    PTT 27.0 (L) 07/08/2022    INR 1.09 07/08/2022       RADIOLOGY:  No radiology results for the last 3 days     I reviewed the patient's new  clinical results.    Cancer Staging (if applicable)  Cancer Patient: __ yes __no __unknown; If yes, clinical stage T:__ N:__M:__, stage group or __N/A    Impression: Large paraesophageal hernia  Recent admission for iron deficiency anemia  Recent melena      Plan: Paraesophageal hernia repair with mesh, laparoscopic Nissen  Esophagogastroduodenoscopy with percutaneous endoscopic gastrostomy tube insertion      KIRIT Davis   12/13/22   6:50 AM EST

## 2022-12-13 NOTE — BRIEF OP NOTE
PARAESOPHAGEAL HERNIA REPAIR LAPAROSCOPIC, ESOPHAGOGASTRODUODENOSCOPY WITH PERCUTANEOUS ENDOSCOPIC GASTROSTOMY TUBE INSERTION  Progress Note    Franky Mccall  12/13/2022    Pre-op Diagnosis:   Paraesophageal hernia       Post-Op Diagnosis Codes:  Same    Procedure/CPT® Codes:        Procedure(s):  PARAESOPHAGEAL HERNIA REPAIR W/ MESH LAPAROSCOPIC, NISSEN  ESOPHAGOGASTRODUODENOSCOPY WITH PERCUTANEOUS ENDOSCOPIC GASTROSTOMY TUBE INSERTION        Surgeon(s):  Faisal Juarez MD Huerta, Gustavo V, MD    Anesthesia: General    Staff:   Circulator: Natalee Finch RN; Chasity John RN  Scrub Person: Joey Ramirez  Nursing Assistant: Live Jacinto         Estimated Blood Loss: minimal    Urine Voided: * No values recorded between 12/13/2022  7:38 AM and 12/13/2022  9:50 AM *    Specimens:                Specimens     ID Source Type Tests Collected By Collected At Frozen?    A Hernia, Sac Tissue · TISSUE PATHOLOGY EXAM   Faisal Juarez MD 12/13/22 3218 No    Description: hernia sac    This specimen was not marked as sent.                Drains: * No LDAs found *    Findings:   1.  Type III paraesophageal hernia with intrathoracic stomach completely reduced and repaired with a posterior hiatal cruroplasty and reinforced with Phasix ST mesh  2.  Nissen fundoplication performed around 48 Moldovan bougie  3.  20 Moldovan PEG tube placed with the skin at the 4 cm level        Complications: None          Faisal Juarez MD     Date: 12/13/2022  Time: 09:59 EST

## 2022-12-14 ENCOUNTER — APPOINTMENT (OUTPATIENT)
Dept: GENERAL RADIOLOGY | Facility: HOSPITAL | Age: 57
End: 2022-12-14

## 2022-12-14 VITALS
RESPIRATION RATE: 16 BRPM | TEMPERATURE: 97.9 F | WEIGHT: 196 LBS | BODY MASS INDEX: 27.44 KG/M2 | HEART RATE: 82 BPM | SYSTOLIC BLOOD PRESSURE: 118 MMHG | OXYGEN SATURATION: 94 % | HEIGHT: 71 IN | DIASTOLIC BLOOD PRESSURE: 80 MMHG

## 2022-12-14 LAB
ANION GAP SERPL CALCULATED.3IONS-SCNC: 7 MMOL/L (ref 5–15)
BASOPHILS # BLD AUTO: 0.04 10*3/MM3 (ref 0–0.2)
BASOPHILS NFR BLD AUTO: 0.4 % (ref 0–1.5)
BUN SERPL-MCNC: 12 MG/DL (ref 6–20)
BUN/CREAT SERPL: 14.5 (ref 7–25)
CALCIUM SPEC-SCNC: 8.9 MG/DL (ref 8.6–10.5)
CHLORIDE SERPL-SCNC: 104 MMOL/L (ref 98–107)
CO2 SERPL-SCNC: 27 MMOL/L (ref 22–29)
CREAT SERPL-MCNC: 0.83 MG/DL (ref 0.76–1.27)
CYTO UR: NORMAL
DEPRECATED RDW RBC AUTO: 44.4 FL (ref 37–54)
EGFRCR SERPLBLD CKD-EPI 2021: 102.7 ML/MIN/1.73
EOSINOPHIL # BLD AUTO: 0.03 10*3/MM3 (ref 0–0.4)
EOSINOPHIL NFR BLD AUTO: 0.3 % (ref 0.3–6.2)
ERYTHROCYTE [DISTWIDTH] IN BLOOD BY AUTOMATED COUNT: 13.5 % (ref 12.3–15.4)
GLUCOSE SERPL-MCNC: 92 MG/DL (ref 65–99)
HCT VFR BLD AUTO: 41.1 % (ref 37.5–51)
HGB BLD-MCNC: 13.5 G/DL (ref 13–17.7)
IMM GRANULOCYTES # BLD AUTO: 0.03 10*3/MM3 (ref 0–0.05)
IMM GRANULOCYTES NFR BLD AUTO: 0.3 % (ref 0–0.5)
LAB AP CASE REPORT: NORMAL
LAB AP CLINICAL INFORMATION: NORMAL
LYMPHOCYTES # BLD AUTO: 1.15 10*3/MM3 (ref 0.7–3.1)
LYMPHOCYTES NFR BLD AUTO: 11.9 % (ref 19.6–45.3)
MCH RBC QN AUTO: 29.8 PG (ref 26.6–33)
MCHC RBC AUTO-ENTMCNC: 32.8 G/DL (ref 31.5–35.7)
MCV RBC AUTO: 90.7 FL (ref 79–97)
MONOCYTES # BLD AUTO: 0.99 10*3/MM3 (ref 0.1–0.9)
MONOCYTES NFR BLD AUTO: 10.2 % (ref 5–12)
NEUTROPHILS NFR BLD AUTO: 7.42 10*3/MM3 (ref 1.7–7)
NEUTROPHILS NFR BLD AUTO: 76.9 % (ref 42.7–76)
NRBC BLD AUTO-RTO: 0 /100 WBC (ref 0–0.2)
PATH REPORT.FINAL DX SPEC: NORMAL
PATH REPORT.GROSS SPEC: NORMAL
PLATELET # BLD AUTO: 212 10*3/MM3 (ref 140–450)
PMV BLD AUTO: 9.8 FL (ref 6–12)
POTASSIUM SERPL-SCNC: 4.2 MMOL/L (ref 3.5–5.2)
RBC # BLD AUTO: 4.53 10*6/MM3 (ref 4.14–5.8)
SODIUM SERPL-SCNC: 138 MMOL/L (ref 136–145)
WBC NRBC COR # BLD: 9.66 10*3/MM3 (ref 3.4–10.8)

## 2022-12-14 PROCEDURE — G0378 HOSPITAL OBSERVATION PER HR: HCPCS

## 2022-12-14 PROCEDURE — 25010000002 HEPARIN (PORCINE) PER 1000 UNITS: Performed by: SURGERY

## 2022-12-14 PROCEDURE — 74240 X-RAY XM UPR GI TRC 1CNTRST: CPT

## 2022-12-14 PROCEDURE — 85025 COMPLETE CBC W/AUTO DIFF WBC: CPT | Performed by: SURGERY

## 2022-12-14 PROCEDURE — 0 DIATRIZOATE MEGLUMINE & SODIUM PER 1 ML: Performed by: SURGERY

## 2022-12-14 PROCEDURE — 80048 BASIC METABOLIC PNL TOTAL CA: CPT | Performed by: SURGERY

## 2022-12-14 RX ORDER — ONDANSETRON 4 MG/1
4 TABLET, FILM COATED ORAL EVERY 6 HOURS PRN
Qty: 60 TABLET | Refills: 0 | Status: SHIPPED | OUTPATIENT
Start: 2022-12-14

## 2022-12-14 RX ORDER — SIMETHICONE 80 MG
80 TABLET,CHEWABLE ORAL 4 TIMES DAILY PRN
Qty: 60 TABLET | Refills: 1 | Status: SHIPPED | OUTPATIENT
Start: 2022-12-14

## 2022-12-14 RX ORDER — HYDROMORPHONE HYDROCHLORIDE 1 MG/ML
0.2 INJECTION, SOLUTION INTRAMUSCULAR; INTRAVENOUS; SUBCUTANEOUS
Status: DISCONTINUED | OUTPATIENT
Start: 2022-12-14 | End: 2022-12-14 | Stop reason: HOSPADM

## 2022-12-14 RX ADMIN — HEPARIN SODIUM 5000 UNITS: 5000 INJECTION INTRAVENOUS; SUBCUTANEOUS at 13:27

## 2022-12-14 RX ADMIN — SODIUM CHLORIDE, POTASSIUM CHLORIDE, SODIUM LACTATE AND CALCIUM CHLORIDE 125 ML/HR: 600; 310; 30; 20 INJECTION, SOLUTION INTRAVENOUS at 07:00

## 2022-12-14 RX ADMIN — HEPARIN SODIUM 5000 UNITS: 5000 INJECTION INTRAVENOUS; SUBCUTANEOUS at 06:05

## 2022-12-14 RX ADMIN — DOCUSATE SODIUM LIQUID 100 MG: 100 LIQUID ORAL at 08:10

## 2022-12-14 RX ADMIN — HYDROCODONE BITARTRATE AND ACETAMINOPHEN 15 ML: 7.5; 325 SOLUTION ORAL at 13:27

## 2022-12-14 RX ADMIN — LANSOPRAZOLE 30 MG: KIT at 08:10

## 2022-12-14 NOTE — PLAN OF CARE
Problem: Adult Inpatient Plan of Care  Goal: Plan of Care Review  Outcome: Ongoing, Progressing  Flowsheets (Taken 12/14/2022 0511)  Progress: improving  Plan of Care Reviewed With: patient  Outcome Evaluation: VSS. Adequate I&O. Pain controled with PCA. Tolerateing po. Denies nausea.  Educated wife and patient on flushing g-tube. Rested well.

## 2022-12-14 NOTE — PLAN OF CARE
Goal Outcome Evaluation:  Plan of Care Reviewed With: (P) patient           Outcome Evaluation: (P) Pain controlled. Denies nausea. Tolerating PO. Ambulating in room. Wife remains at bedside. Patient educated on flushing PEG, new drainage bag provided. Demontrated understanding by teach back.

## 2022-12-14 NOTE — CONSULTS
Clinical Nutrition     Nutrition Education   Reason for Visit:   Physician Consult       Patient Name: Franky Mccall  YOB: 1965  MRN: [id number]  Date of Encounter: 12/13/22 20:35 EST  Admission date: 12/13/2022      Applicable Diagnoses     PEH s/p repair    Diet/Nutrition Related History:     Pt allows will follow diet. Rpts no perceived wt loss.     Labs reviewed   Yes    Nutrition Diagnosis     12/13  Problem Food and nutrition knowledge deficit   Related To Diet post fundoplication   Signs/Symptoms rpted   Status: pt w guide to follow    Goal:     Increase knowledge on diet/nutrition effects on condition/status     Nutrition Intervention     Education provided regarding nutrition therapy for: Diet rationale, Key food habit change and Post Nissen Fundoplication      Education provided regarding food habits/behavior related to:Food choices     RD provided printed material via Diet After Nissen Fundoplication Surgery; Material developed by [facility] and Dr. Faisal Juarez       Monitoring/Evaluation:     Pt acknowledged understanding of material covered      Elidia Villa RD  Time Spent: 20 min       DISPLAY PLAN FREE TEXT

## 2022-12-14 NOTE — PROGRESS NOTES
"Patient Name:  Franky Mccall  YOB: 1965  [id number]    Surgery Progress Note    Date of visit: 12/14/2022    Subjective   Subjective: Feeling better. Pain controlled.         Objective     Objective:     /80 (BP Location: Right arm, Patient Position: Lying)   Pulse 82   Temp 97.9 °F (36.6 °C) (Temporal)   Resp 16   Ht 180.3 cm (71\")   Wt 88.9 kg (196 lb)   SpO2 94%   BMI 27.34 kg/m²     Intake/Output Summary (Last 24 hours) at 12/14/2022 0829  Last data filed at 12/14/2022 0659  Gross per 24 hour   Intake 3952.22 ml   Output 3120 ml   Net 832.22 ml       CV:  Rhythm  regular and rate regular   L:  Clear  to auscultation bilaterally   Abd:  Bowel sounds positive , soft, appropriately tender. Dressings c/d/i  Ext:  No cyanosis, clubbing, edema    Recent labs that are back at this time have been reviewed.            Assessment/ Plan:    Problem List Items Addressed This Visit        Gastrointestinal Abdominal     * (Principal) Paraesophageal hernia- Doing well after PEH repair. UGI and teaching today. Possibly home later today vs tomorrow.      Relevant Orders    Tissue Pathology Exam        Active Hospital Problems    Diagnosis  POA   • **Paraesophageal hernia [K44.9]  Yes      Resolved Hospital Problems   No resolved problems to display.              Faisal Juarez MD  12/14/2022  08:29 EST      "

## 2022-12-14 NOTE — CASE MANAGEMENT/SOCIAL WORK
Continued Stay Note  [facility]     Patient Name: Franky Mccall  MRN: [id number]  Today's Date: 12/14/2022    Admit Date: 12/13/2022    Plan: Home   Discharge Plan     Row Name 12/14/22 1455       Plan    Plan Home    Plan Comments Anticipate discharge home today.  Alka Kirby, Ext. [phone]    Final Discharge Disposition Code 01 - home or self-care               Discharge Codes    No documentation.               Expected Discharge Date and Time     Expected Discharge Date Expected Discharge Time    Dec 14, 2022             NIKKO Knight

## 2022-12-14 NOTE — DISCHARGE SUMMARY
Discharge Summary    Patient Name:  Franky Mccall  YOB: 1965  [id number]      DATE OF ADMISSION: 12/13/2022    DATE OF DISCHARGE: 12/14/2022       FINAL DIAGNOSES:   Patient Active Problem List   Diagnosis   • Anemia due to blood loss, acute   • Iron deficiency anemia due to chronic blood loss   • Paraesophageal hernia       CONSULTING SERVICES: None      PROCEDURES PERFORMED:   1. Laparoscopic Paraesophageal hernia repair with mesh, Nissen fundoplication, EGD with PEG Placement  on 12/13/2022    HISTORY: The patient is a very pleasant 57 y.o. male with a history of GERD and paraesophageal hernia  . After a complete pre-operative workup he was deemed an operative candidate. The risks and benefits of operation were discussed at length with the patient and their family, and they agreed to proceed.      HOSPITAL COURSE:  The patient came in as an outpatient, underwent his operative procedure, and was transferred to the floor.  Postoperatively he did well.  Their pain was initially controlled on IV PCA, and transitioned to oral medications.  A Gastrografin swallow on postoperative day 1 showed an excellent technical result  . They continued to improve, were instructed on appropriate dietary changes, instructed in the care of his PEG , and ready for discharge home on 12/14/2022 .      CONDITION: At the time of discharge, the patient is tolerating a post-fundoplication  diet without difficulty. They are having normal bowel and bladder function, and his incisions are clean, dry and intact. his PEG tube is clean, dry and intact and he has been taught in its care by the nursing staff       DISCHARGE MEDICATIONS:   1. All previous home medications.   2. Hydrocodone   elixir 7.5mg PO Q4 hours as needed for pain  3. Colace elixir 100mg PO BID   4. Zofran 4 mg PO Q6 hours as needed for nausea  5. Simethicone 80- mg Po q6h as needed for bloating     DISCHARGE INSTRUCTIONS:  1. The patient is instructed to  follow up with Dr. Juarez in  4  weeks’ time.  2. he is instructed to refrain from lifting more than 15 or 20 pounds until their return to clinic.   3. If the patient has worsening problems with fever or chills nausea or vomiting he is to contact Dr. Juarez's office and a contact card has been provided.  4. he is to keep his PEG tube clamped, and flush it twice daily with 60ml of water. If there is nausea or bloating uncontrolled with medications, he is to place the tube to gravity for 30 minutes, then reclamp the tube. They have been taught this by the nursing staff prior to discharge.        Faisal Juarez MD  12/14/2022

## 2023-03-13 ENCOUNTER — OFFICE VISIT (OUTPATIENT)
Dept: UROLOGY | Facility: CLINIC | Age: 58
End: 2023-03-13
Payer: COMMERCIAL

## 2023-03-13 VITALS — SYSTOLIC BLOOD PRESSURE: 122 MMHG | DIASTOLIC BLOOD PRESSURE: 74 MMHG

## 2023-03-13 DIAGNOSIS — R35.1 NOCTURIA: ICD-10-CM

## 2023-03-13 DIAGNOSIS — N40.0 BENIGN PROSTATIC HYPERPLASIA, UNSPECIFIED WHETHER LOWER URINARY TRACT SYMPTOMS PRESENT: ICD-10-CM

## 2023-03-13 DIAGNOSIS — N43.3 HYDROCELE, UNSPECIFIED HYDROCELE TYPE: Primary | ICD-10-CM

## 2023-03-13 PROCEDURE — 99213 OFFICE O/P EST LOW 20 MIN: CPT | Performed by: UROLOGY

## 2023-03-14 LAB — PSA SERPL-MCNC: 1.57 NG/ML (ref 0–4)

## 2023-11-13 ENCOUNTER — PROCEDURE VISIT (OUTPATIENT)
Dept: UROLOGY | Facility: CLINIC | Age: 58
End: 2023-11-13
Payer: COMMERCIAL

## 2023-11-13 DIAGNOSIS — N43.3 HYDROCELE, UNSPECIFIED HYDROCELE TYPE: Primary | ICD-10-CM

## 2023-11-13 PROCEDURE — 55000 DRAINAGE OF HYDROCELE: CPT | Performed by: UROLOGY

## 2023-11-13 PROCEDURE — 76942 ECHO GUIDE FOR BIOPSY: CPT | Performed by: UROLOGY

## 2023-11-13 NOTE — PROGRESS NOTES
Preoperative diagnosis  Hydrocele    Postoperative diagnosis  Same    Procedure performed  Hydrocele drainage with ultrasound guidance    Surgeon  Madi Dominguez MD    Anesthesia  5 mL lidocaine 2% local injection    Complications  None    EBL  2 mL    Specimen  Left vas  Right vas    Indications  59 y.o. male agreed to undergo the above named procedure after discussion of the alternatives, risks and benefits.  Informed consent was obtained.      Procedure  The patient was brought to the procedure room and placed in supine position.  His scrotum was prepped with Hibiclens and he was draped in a sterile fashion.  A timeout was performed.  He was given oral antibiotic for prophylaxis.    We began the procedure with ultrasound identification of the hydrocele area.  A small amount of local anesthetic was instilled of the anterior scrotum and a wheal.  We then placed an 18-gauge needle through it and into the hydrocele sac under ultrasound guidance.  The entirety of the hydrocele was then drained.  Total volume drained was 200 cc.  There was minimal bleeding.  The patient tolerated the procedure well.

## 2024-06-19 ENCOUNTER — TELEPHONE (OUTPATIENT)
Dept: UROLOGY | Facility: CLINIC | Age: 59
End: 2024-06-19

## 2024-06-19 NOTE — TELEPHONE ENCOUNTER
Provider: DR WOODS    Caller:  ROSE ARIAS    Relationship to Patient: SELF    Reason for Call: PATIENT WANTING TO BE SCHEDULED FOR DRAINAGE OF HYDROCELE AND PROSTATE EXAM. PLEASE REACH OUT TO GET THIS SCHEDULED FOR HIM.    HUB AGENT UNABLE TO WARM TRANSFER    BEST NUMBER IS HIS MOBILE [phone]. YOU MAY LEAVE HIM A MESSAGE IF HE DOES NOT ANSWER.

## 2024-06-28 ENCOUNTER — HOSPITAL ENCOUNTER (INPATIENT)
Facility: HOSPITAL | Age: 59
LOS: 1 days | Discharge: HOME | DRG: 389 | End: 2024-06-29
Attending: STUDENT IN AN ORGANIZED HEALTH CARE EDUCATION/TRAINING PROGRAM
Payer: COMMERCIAL

## 2024-06-28 ENCOUNTER — APPOINTMENT (OUTPATIENT)
Dept: CT IMAGING | Facility: HOSPITAL | Age: 59
DRG: 389 | End: 2024-06-28
Payer: COMMERCIAL

## 2024-06-28 DIAGNOSIS — K56.609 SMALL BOWEL OBSTRUCTION: Primary | ICD-10-CM

## 2024-06-28 DIAGNOSIS — R10.13 EPIGASTRIC PAIN: ICD-10-CM

## 2024-06-28 LAB
ALBUMIN SERPL-MCNC: 4.3 G/DL (ref 3.5–5.2)
ALBUMIN/GLOB SERPL: 1.5 G/DL
ALP SERPL-CCNC: 84 U/L (ref 39–117)
ALT SERPL W P-5'-P-CCNC: 9 U/L (ref 1–41)
ANION GAP SERPL CALCULATED.3IONS-SCNC: 9.5 MMOL/L (ref 5–15)
AST SERPL-CCNC: 21 U/L (ref 1–40)
BACTERIA UR QL AUTO: ABNORMAL /HPF
BASOPHILS # BLD AUTO: 0.07 10*3/MM3 (ref 0–0.2)
BASOPHILS NFR BLD AUTO: 0.8 % (ref 0–1.5)
BILIRUB SERPL-MCNC: 0.5 MG/DL (ref 0–1.2)
BILIRUB UR QL STRIP: NEGATIVE
BUN SERPL-MCNC: 20 MG/DL (ref 6–20)
BUN/CREAT SERPL: 20 (ref 7–25)
CALCIUM SPEC-SCNC: 8.9 MG/DL (ref 8.6–10.5)
CHLORIDE SERPL-SCNC: 106 MMOL/L (ref 98–107)
CLARITY UR: CLEAR
CO2 SERPL-SCNC: 24.5 MMOL/L (ref 22–29)
COLOR UR: YELLOW
CREAT SERPL-MCNC: 1 MG/DL (ref 0.76–1.27)
D-LACTATE SERPL-SCNC: 0.6 MMOL/L (ref 0.5–2)
DEPRECATED RDW RBC AUTO: 42.5 FL (ref 37–54)
EGFRCR SERPLBLD CKD-EPI 2021: 87.2 ML/MIN/1.73
EOSINOPHIL # BLD AUTO: 0.25 10*3/MM3 (ref 0–0.4)
EOSINOPHIL NFR BLD AUTO: 3 % (ref 0.3–6.2)
ERYTHROCYTE [DISTWIDTH] IN BLOOD BY AUTOMATED COUNT: 12.6 % (ref 12.3–15.4)
GLOBULIN UR ELPH-MCNC: 2.8 GM/DL
GLUCOSE SERPL-MCNC: 103 MG/DL (ref 65–99)
GLUCOSE UR STRIP-MCNC: NEGATIVE MG/DL
HCT VFR BLD AUTO: 43.2 % (ref 37.5–51)
HGB BLD-MCNC: 14.7 G/DL (ref 13–17.7)
HGB UR QL STRIP.AUTO: NEGATIVE
HOLD SPECIMEN: NORMAL
HOLD SPECIMEN: NORMAL
HYALINE CASTS UR QL AUTO: ABNORMAL /LPF
IMM GRANULOCYTES # BLD AUTO: 0.03 10*3/MM3 (ref 0–0.05)
IMM GRANULOCYTES NFR BLD AUTO: 0.4 % (ref 0–0.5)
KETONES UR QL STRIP: NEGATIVE
LEUKOCYTE ESTERASE UR QL STRIP.AUTO: ABNORMAL
LIPASE SERPL-CCNC: 14 U/L (ref 13–60)
LYMPHOCYTES # BLD AUTO: 2.12 10*3/MM3 (ref 0.7–3.1)
LYMPHOCYTES NFR BLD AUTO: 25.5 % (ref 19.6–45.3)
MCH RBC QN AUTO: 31.3 PG (ref 26.6–33)
MCHC RBC AUTO-ENTMCNC: 34 G/DL (ref 31.5–35.7)
MCV RBC AUTO: 91.9 FL (ref 79–97)
MONOCYTES # BLD AUTO: 0.67 10*3/MM3 (ref 0.1–0.9)
MONOCYTES NFR BLD AUTO: 8.1 % (ref 5–12)
NEUTROPHILS NFR BLD AUTO: 5.16 10*3/MM3 (ref 1.7–7)
NEUTROPHILS NFR BLD AUTO: 62.2 % (ref 42.7–76)
NITRITE UR QL STRIP: NEGATIVE
NRBC BLD AUTO-RTO: 0 /100 WBC (ref 0–0.2)
PH UR STRIP.AUTO: 6 [PH] (ref 5–8)
PLATELET # BLD AUTO: 241 10*3/MM3 (ref 140–450)
PMV BLD AUTO: 9.7 FL (ref 6–12)
POTASSIUM SERPL-SCNC: 4 MMOL/L (ref 3.5–5.2)
PROT SERPL-MCNC: 7.1 G/DL (ref 6–8.5)
PROT UR QL STRIP: NEGATIVE
RBC # BLD AUTO: 4.7 10*6/MM3 (ref 4.14–5.8)
RBC # UR STRIP: ABNORMAL /HPF
REF LAB TEST METHOD: ABNORMAL
SODIUM SERPL-SCNC: 140 MMOL/L (ref 136–145)
SP GR UR STRIP: 1.02 (ref 1–1.03)
SQUAMOUS #/AREA URNS HPF: ABNORMAL /HPF
UROBILINOGEN UR QL STRIP: ABNORMAL
WBC # UR STRIP: ABNORMAL /HPF
WBC NRBC COR # BLD AUTO: 8.3 10*3/MM3 (ref 3.4–10.8)
WHOLE BLOOD HOLD COAG: NORMAL
WHOLE BLOOD HOLD SPECIMEN: NORMAL

## 2024-06-28 PROCEDURE — 25510000001 IOPAMIDOL 61 % SOLUTION: Performed by: STUDENT IN AN ORGANIZED HEALTH CARE EDUCATION/TRAINING PROGRAM

## 2024-06-28 PROCEDURE — 36415 COLL VENOUS BLD VENIPUNCTURE: CPT

## 2024-06-28 PROCEDURE — 83690 ASSAY OF LIPASE: CPT

## 2024-06-28 PROCEDURE — 25810000003 SODIUM CHLORIDE 0.9 % SOLUTION: Performed by: STUDENT IN AN ORGANIZED HEALTH CARE EDUCATION/TRAINING PROGRAM

## 2024-06-28 PROCEDURE — 99285 EMERGENCY DEPT VISIT HI MDM: CPT

## 2024-06-28 PROCEDURE — 85025 COMPLETE CBC W/AUTO DIFF WBC: CPT

## 2024-06-28 PROCEDURE — 81001 URINALYSIS AUTO W/SCOPE: CPT

## 2024-06-28 PROCEDURE — 83605 ASSAY OF LACTIC ACID: CPT

## 2024-06-28 PROCEDURE — 80053 COMPREHEN METABOLIC PANEL: CPT

## 2024-06-28 PROCEDURE — 74177 CT ABD & PELVIS W/CONTRAST: CPT

## 2024-06-28 RX ORDER — SODIUM CHLORIDE 0.9 % (FLUSH) 0.9 %
10 SYRINGE (ML) INJECTION AS NEEDED
Status: DISCONTINUED | OUTPATIENT
Start: 2024-06-28 | End: 2024-06-29 | Stop reason: HOSPADM

## 2024-06-28 RX ORDER — LIDOCAINE HYDROCHLORIDE 20 MG/ML
5 SOLUTION OROPHARYNGEAL ONCE
Status: COMPLETED | OUTPATIENT
Start: 2024-06-28 | End: 2024-06-28

## 2024-06-28 RX ORDER — ALUMINA, MAGNESIA, AND SIMETHICONE 2400; 2400; 240 MG/30ML; MG/30ML; MG/30ML
15 SUSPENSION ORAL ONCE
Status: COMPLETED | OUTPATIENT
Start: 2024-06-28 | End: 2024-06-28

## 2024-06-28 RX ADMIN — IOPAMIDOL 100 ML: 612 INJECTION, SOLUTION INTRAVENOUS at 23:11

## 2024-06-28 RX ADMIN — LIDOCAINE HYDROCHLORIDE 5 ML: 20 SOLUTION ORAL at 23:18

## 2024-06-28 RX ADMIN — ALUMINUM HYDROXIDE, MAGNESIUM HYDROXIDE, DIMETHICONE 15 ML: 400; 400; 40 SUSPENSION ORAL at 23:18

## 2024-06-28 RX ADMIN — SODIUM CHLORIDE 500 ML: 9 INJECTION, SOLUTION INTRAVENOUS at 23:17

## 2024-06-28 NOTE — Clinical Note
[facility] EMERGENCY DEPARTMENT  [address] [address]  Phone: [phone]    Franky Mccall was seen and treated in our emergency department on 6/28/2024.  He may return to work on 07/03/2024.         Thank you for choosing [facility].    Merritt De Santiago MD

## 2024-06-28 NOTE — Clinical Note
Level of Care: Med/Surg [1]   Diagnosis: SBO (small bowel obstruction) [829691]   Certification: I Certify That Inpatient Hospital Services Are Medically Necessary For Greater Than 2 Midnights

## 2024-06-29 ENCOUNTER — APPOINTMENT (OUTPATIENT)
Dept: GENERAL RADIOLOGY | Facility: HOSPITAL | Age: 59
DRG: 389 | End: 2024-06-29
Payer: COMMERCIAL

## 2024-06-29 VITALS
DIASTOLIC BLOOD PRESSURE: 91 MMHG | HEIGHT: 71 IN | OXYGEN SATURATION: 98 % | WEIGHT: 190 LBS | HEART RATE: 63 BPM | RESPIRATION RATE: 18 BRPM | TEMPERATURE: 97.5 F | SYSTOLIC BLOOD PRESSURE: 108 MMHG | BODY MASS INDEX: 26.6 KG/M2

## 2024-06-29 PROBLEM — K56.609 SBO (SMALL BOWEL OBSTRUCTION): Status: ACTIVE | Noted: 2024-06-29

## 2024-06-29 LAB
ANION GAP SERPL CALCULATED.3IONS-SCNC: 8.5 MMOL/L (ref 5–15)
BASOPHILS # BLD AUTO: 0.04 10*3/MM3 (ref 0–0.2)
BASOPHILS NFR BLD AUTO: 0.5 % (ref 0–1.5)
BUN SERPL-MCNC: 18 MG/DL (ref 6–20)
BUN/CREAT SERPL: 21.7 (ref 7–25)
CALCIUM SPEC-SCNC: 8.5 MG/DL (ref 8.6–10.5)
CHLORIDE SERPL-SCNC: 107 MMOL/L (ref 98–107)
CO2 SERPL-SCNC: 24.5 MMOL/L (ref 22–29)
CREAT SERPL-MCNC: 0.83 MG/DL (ref 0.76–1.27)
DEPRECATED RDW RBC AUTO: 43.6 FL (ref 37–54)
EGFRCR SERPLBLD CKD-EPI 2021: 101.4 ML/MIN/1.73
EOSINOPHIL # BLD AUTO: 0.06 10*3/MM3 (ref 0–0.4)
EOSINOPHIL NFR BLD AUTO: 0.7 % (ref 0.3–6.2)
ERYTHROCYTE [DISTWIDTH] IN BLOOD BY AUTOMATED COUNT: 12.5 % (ref 12.3–15.4)
GLUCOSE SERPL-MCNC: 103 MG/DL (ref 65–99)
HCT VFR BLD AUTO: 44.4 % (ref 37.5–51)
HGB BLD-MCNC: 14.5 G/DL (ref 13–17.7)
IMM GRANULOCYTES # BLD AUTO: 0.02 10*3/MM3 (ref 0–0.05)
IMM GRANULOCYTES NFR BLD AUTO: 0.2 % (ref 0–0.5)
LYMPHOCYTES # BLD AUTO: 0.99 10*3/MM3 (ref 0.7–3.1)
LYMPHOCYTES NFR BLD AUTO: 11.8 % (ref 19.6–45.3)
MCH RBC QN AUTO: 30.6 PG (ref 26.6–33)
MCHC RBC AUTO-ENTMCNC: 32.7 G/DL (ref 31.5–35.7)
MCV RBC AUTO: 93.7 FL (ref 79–97)
MONOCYTES # BLD AUTO: 0.53 10*3/MM3 (ref 0.1–0.9)
MONOCYTES NFR BLD AUTO: 6.3 % (ref 5–12)
NEUTROPHILS NFR BLD AUTO: 6.75 10*3/MM3 (ref 1.7–7)
NEUTROPHILS NFR BLD AUTO: 80.5 % (ref 42.7–76)
NRBC BLD AUTO-RTO: 0 /100 WBC (ref 0–0.2)
PLATELET # BLD AUTO: 195 10*3/MM3 (ref 140–450)
PMV BLD AUTO: 9.4 FL (ref 6–12)
POTASSIUM SERPL-SCNC: 4.2 MMOL/L (ref 3.5–5.2)
RBC # BLD AUTO: 4.74 10*6/MM3 (ref 4.14–5.8)
SODIUM SERPL-SCNC: 140 MMOL/L (ref 136–145)
WBC NRBC COR # BLD AUTO: 8.39 10*3/MM3 (ref 3.4–10.8)

## 2024-06-29 PROCEDURE — 96372 THER/PROPH/DIAG INJ SC/IM: CPT

## 2024-06-29 PROCEDURE — 80048 BASIC METABOLIC PNL TOTAL CA: CPT | Performed by: INTERNAL MEDICINE

## 2024-06-29 PROCEDURE — 25810000003 SODIUM CHLORIDE 0.9 % SOLUTION: Performed by: INTERNAL MEDICINE

## 2024-06-29 PROCEDURE — 99221 1ST HOSP IP/OBS SF/LOW 40: CPT | Performed by: SURGERY

## 2024-06-29 PROCEDURE — 74018 RADEX ABDOMEN 1 VIEW: CPT

## 2024-06-29 PROCEDURE — 96374 THER/PROPH/DIAG INJ IV PUSH: CPT

## 2024-06-29 PROCEDURE — 25010000002 MORPHINE PER 10 MG: Performed by: STUDENT IN AN ORGANIZED HEALTH CARE EDUCATION/TRAINING PROGRAM

## 2024-06-29 PROCEDURE — 99238 HOSP IP/OBS DSCHRG MGMT 30/<: CPT | Performed by: FAMILY MEDICINE

## 2024-06-29 PROCEDURE — 25010000002 ENOXAPARIN PER 10 MG: Performed by: INTERNAL MEDICINE

## 2024-06-29 PROCEDURE — 96361 HYDRATE IV INFUSION ADD-ON: CPT

## 2024-06-29 PROCEDURE — 85025 COMPLETE CBC W/AUTO DIFF WBC: CPT | Performed by: INTERNAL MEDICINE

## 2024-06-29 RX ORDER — BISACODYL 10 MG
10 SUPPOSITORY, RECTAL RECTAL DAILY PRN
Status: DISCONTINUED | OUTPATIENT
Start: 2024-06-29 | End: 2024-06-29 | Stop reason: HOSPADM

## 2024-06-29 RX ORDER — POLYETHYLENE GLYCOL 3350 17 G/17G
17 POWDER, FOR SOLUTION ORAL DAILY PRN
Status: DISCONTINUED | OUTPATIENT
Start: 2024-06-29 | End: 2024-06-29 | Stop reason: HOSPADM

## 2024-06-29 RX ORDER — NALOXONE HCL 0.4 MG/ML
0.4 VIAL (ML) INJECTION
Status: DISCONTINUED | OUTPATIENT
Start: 2024-06-29 | End: 2024-06-29 | Stop reason: HOSPADM

## 2024-06-29 RX ORDER — SODIUM CHLORIDE 9 MG/ML
40 INJECTION, SOLUTION INTRAVENOUS AS NEEDED
Status: DISCONTINUED | OUTPATIENT
Start: 2024-06-29 | End: 2024-06-29 | Stop reason: HOSPADM

## 2024-06-29 RX ORDER — SODIUM CHLORIDE 0.9 % (FLUSH) 0.9 %
10 SYRINGE (ML) INJECTION AS NEEDED
Status: DISCONTINUED | OUTPATIENT
Start: 2024-06-29 | End: 2024-06-29 | Stop reason: HOSPADM

## 2024-06-29 RX ORDER — SODIUM CHLORIDE 0.9 % (FLUSH) 0.9 %
10 SYRINGE (ML) INJECTION EVERY 12 HOURS SCHEDULED
Status: DISCONTINUED | OUTPATIENT
Start: 2024-06-29 | End: 2024-06-29 | Stop reason: HOSPADM

## 2024-06-29 RX ORDER — MORPHINE SULFATE 2 MG/ML
2 INJECTION, SOLUTION INTRAMUSCULAR; INTRAVENOUS EVERY 4 HOURS PRN
Status: DISCONTINUED | OUTPATIENT
Start: 2024-06-29 | End: 2024-06-29 | Stop reason: HOSPADM

## 2024-06-29 RX ORDER — ONDANSETRON 2 MG/ML
4 INJECTION INTRAMUSCULAR; INTRAVENOUS EVERY 6 HOURS PRN
Status: DISCONTINUED | OUTPATIENT
Start: 2024-06-29 | End: 2024-06-29 | Stop reason: HOSPADM

## 2024-06-29 RX ORDER — SODIUM CHLORIDE 9 MG/ML
100 INJECTION, SOLUTION INTRAVENOUS CONTINUOUS
Status: DISCONTINUED | OUTPATIENT
Start: 2024-06-29 | End: 2024-06-29

## 2024-06-29 RX ORDER — ENOXAPARIN SODIUM 100 MG/ML
40 INJECTION SUBCUTANEOUS EVERY 24 HOURS
Status: DISCONTINUED | OUTPATIENT
Start: 2024-06-29 | End: 2024-06-29 | Stop reason: HOSPADM

## 2024-06-29 RX ORDER — BISACODYL 5 MG/1
5 TABLET, DELAYED RELEASE ORAL DAILY PRN
Status: DISCONTINUED | OUTPATIENT
Start: 2024-06-29 | End: 2024-06-29 | Stop reason: HOSPADM

## 2024-06-29 RX ORDER — AMOXICILLIN 250 MG
2 CAPSULE ORAL 2 TIMES DAILY PRN
Status: DISCONTINUED | OUTPATIENT
Start: 2024-06-29 | End: 2024-06-29 | Stop reason: HOSPADM

## 2024-06-29 RX ADMIN — MORPHINE SULFATE 4 MG: 4 INJECTION, SOLUTION INTRAMUSCULAR; INTRAVENOUS at 00:54

## 2024-06-29 RX ADMIN — SODIUM CHLORIDE 100 ML/HR: 9 INJECTION, SOLUTION INTRAVENOUS at 02:17

## 2024-06-29 RX ADMIN — SODIUM CHLORIDE 100 ML/HR: 9 INJECTION, SOLUTION INTRAVENOUS at 13:04

## 2024-06-29 RX ADMIN — ENOXAPARIN SODIUM 40 MG: 100 INJECTION SUBCUTANEOUS at 02:18

## 2024-06-29 NOTE — H&P
[facility] HOSPITALIST   HISTORY AND PHYSICAL      Name:  Franky Mccall   Age:  59 y.o.  Sex:  male  :  1965  MRN:  [id number]   Visit Number:  [id number]  Admission Date:  2024  Date Of Service:  24  Primary Care Physician:  Abel Sweeney DO    Chief Complaint:     Abdominal pain    History Of Presenting Illness:      Patient is a 59-year-old male with history significant for paraesophageal hernia status post repair with mesh 2 years ago.  Presents to the emergency room tonight with acute onset abdominal pain after drinking a carbonated beverage.  Patient reports he had a bowel movement earlier in the day but abdomen now significantly distended and tender.  Has never had an obstruction previously.    ED summary: Patient afebrile, hemodynamically stable and nonhypoxic on room air.  CMP CBC unremarkable.  Lactate lipase normal.  UA positive for leukocytes 1+ bacteria and WBC.  CT abdomen pelvis confirms small bowel obstruction.  NG tube placed in the ER.  Dr. Ashley with general surgery agreed to consult.  Hospitalist asked to admit.    Review Of Systems:    All systems were reviewed and negative except as mentioned in history of presenting illness, assessment and plan.    Past Medical History: Patient  has a past medical history of Esophageal hernia, GERD (gastroesophageal reflux disease), and History of transfusion.    Past Surgical History: Patient  has a past surgical history that includes Esophagogastroduodenoscopy (N/A, 2022); Colonoscopy (N/A, 2022); Esophageal motility study (N/A, 2022); Paraesophageal hernia repair (N/A, 2022); and Esophagogastroduodenoscopy w/ PEG (N/A, 2022).    Social History: Patient  reports that he has never smoked. He has never used smokeless tobacco. He reports that he does not currently use alcohol. He reports that he does not use drugs.    Family History:  Patient's family history has been reviewed and found to  "be noncontributory.     Allergies:      Patient has no known allergies.    Home Medications:    Prior to Admission Medications       Prescriptions Last Dose Informant Patient Reported? Taking?    docusate (COLACE) 50 MG/5ML liquid   No No    Take 10 mL by mouth Daily.    HYDROcodone-acetaminophen (HYCET) 7.5-325 MG/15ML solution   No No    Take 15 mL by mouth Every 4 (Four) Hours As Needed for breakthrough pain    ondansetron (ZOFRAN) 4 MG tablet   No No    Take 1 tablet by mouth Every 6 (Six) Hours As Needed for Nausea or Vomiting.    pantoprazole (Protonix) 40 MG EC tablet   No No    Take 1 tablet by mouth Daily.    simethicone (MYLICON) 80 MG chewable tablet   No No    Chew 1 tablet 4 (Four) Times a Day As Needed for Flatulence (bloating).          ED Medications:    Medications   sodium chloride 0.9 % flush 10 mL (has no administration in time range)   morphine injection 4 mg (has no administration in time range)   sodium chloride 0.9 % bolus 500 mL (0 mL Intravenous Stopped 6/29/24 0003)   aluminum-magnesium hydroxide-simethicone (MAALOX MAX) 400-400-40 MG/5ML suspension 15 mL (15 mL Oral Given 6/28/24 2318)   Lidocaine Viscous HCl (XYLOCAINE) 2 % solution 5 mL (5 mL Mouth/Throat Given 6/28/24 2318)   iopamidol (ISOVUE-300) 61 % injection 100 mL (100 mL Intravenous Given 6/28/24 2311)     Vital Signs:  Temp:  [97.5 °F (36.4 °C)] 97.5 °F (36.4 °C)  Heart Rate:  [70] 70  Resp:  [18] 18  BP: (134)/(83) 134/83        06/28/24 2238   Weight: 86.2 kg (190 lb)     Body mass index is 26.5 kg/m².    Physical Exam:     Most recent vital Signs: /83 (BP Location: Left arm, Patient Position: Sitting)   Pulse 70   Temp 97.5 °F (36.4 °C) (Oral)   Resp 18   Ht 180.3 cm (71\")   Wt 86.2 kg (190 lb)   SpO2 99%   BMI 26.50 kg/m²     Physical Exam  Vitals reviewed.   Constitutional:       Appearance: He is normal weight.   HENT:      Head: Normocephalic and atraumatic.      Right Ear: External ear normal.      Left " Ear: External ear normal.      Mouth/Throat:      Mouth: Mucous membranes are moist.      Pharynx: Oropharynx is clear.   Eyes:      Extraocular Movements: Extraocular movements intact.      Conjunctiva/sclera: Conjunctivae normal.   Cardiovascular:      Rate and Rhythm: Normal rate and regular rhythm.      Pulses: Normal pulses.      Heart sounds: Normal heart sounds.   Pulmonary:      Effort: Pulmonary effort is normal.      Breath sounds: Normal breath sounds.   Abdominal:      General: There is distension.      Tenderness: There is abdominal tenderness.      Comments: Hypoactive bowel sounds   Musculoskeletal:         General: Normal range of motion.   Skin:     General: Skin is warm and dry.   Neurological:      General: No focal deficit present.      Mental Status: He is alert and oriented to person, place, and time.         Laboratory data:    I have reviewed the labs done in the emergency room.    Results from last 7 days   Lab Units 06/28/24  2249   SODIUM mmol/L 140   POTASSIUM mmol/L 4.0   CHLORIDE mmol/L 106   CO2 mmol/L 24.5   BUN mg/dL 20   CREATININE mg/dL 1.00   CALCIUM mg/dL 8.9   BILIRUBIN mg/dL 0.5   ALK PHOS U/L 84   ALT (SGPT) U/L 9   AST (SGOT) U/L 21   GLUCOSE mg/dL 103*     Results from last 7 days   Lab Units 06/28/24  2249   WBC 10*3/mm3 8.30   HEMOGLOBIN g/dL 14.7   HEMATOCRIT % 43.2   PLATELETS 10*3/mm3 241                     Results from last 7 days   Lab Units 06/28/24  2249   LIPASE U/L 14         Results from last 7 days   Lab Units 06/28/24  2255   COLOR UA  Yellow   GLUCOSE UA  Negative   KETONES UA  Negative   BLOOD UA  Negative   LEUKOCYTES UA  Trace*   PH, URINE  6.0   BILIRUBIN UA  Negative   UROBILINOGEN UA  0.2 E.U./dL   RBC UA /HPF None Seen   WBC UA /HPF 3-5*       Pain Management Panel           No data to display                EKG:          Radiology:    CT Abdomen Pelvis With Contrast    Result Date: 6/28/2024  FINAL REPORT TECHNIQUE: Axial CT images were performed from  the lung bases through the symphysis pubis after the administration of intravenous contrast.  This study was performed with techniques to keep radiation doses as low as reasonably achievable (ALARA). Individualized dose reduction techniques using automated exposure control or adjustment of mA and/or kV according to the patient's size were employed. CLINICAL HISTORY: Upper abdominal pain/ umbilical pain that began this evening. Pain worse on LT side  Hx. hiatal hernia repair 2 years ago FINDINGS: LOWER CHEST: The heart is normal size.  The lung bases are clear.  ABDOMEN/PELVIS:  Liver, gallbladder and bile ducts: The liver enhances homogeneously without suspicious focal hepatic lesion.  The gallbladder is unremarkable.  There is no definite biliary duct dilatation.  Adrenal glands: The adrenal glands are morphologically unremarkable without suspicious lesion. Kidneys, ureter and urinary bladder: No suspicious renal lesion. No hydronephrosis.  Urinary bladder is unremarkable.  Spleen: The spleen is normal size.  Pancreas: The pancreas is grossly unremarkable.  GI systems and mesentery: The patient status post gastric fundoplication.  There are multiple dilated gas and fluid-filled segments of small bowel.  Distal small bowel and colon are normal in caliber.  There appears to be a transition point in the left lower quadrant.  Image 78 series 2.  The appendix is visualized and unremarkable in appearance.  No significant mesenteric inflammation.  Lymph nodes: No definite pathologically enlarged abdominal or pelvic lymph nodes present. Vessels: The aorta and abdominal arteries are grossly patent. The IVC and portal vein are patent and grossly unremarkable. Peritoneum: No free intraperitoneal fluid or pneumoperitoneum. Pelvic viscera: No acute findings.  Body wall: No body wall contusion. No significant body wall hernias.  Bones: No acute fracture.     Small bowel obstruction. Authenticated and Electronically Signed by  Vicente Garcia MD on 06/28/2024 11:54:04 PM     Assessment:    SBO  Asymptomatic bacteriuria  Paraesophageal hernia status post mesh repair  GERD    Plan:    SBO  -NG tube placement in the ER  -Pain medication and antiemetics as indicated  -Gentle IV fluid  -Dr. Ashley General surgery consulted    Further orders as clinical course dictates.    Risk Assessment: Moderate  DVT Prophylaxis: Lovenox  Code Status: Full  Diet: N.p.o.          Osbaldo Best DO  06/29/24  00:49 EDT    Dictated utilizing Dragon dictation.

## 2024-06-29 NOTE — CONSULTS
General Surgery Consult     Name:Franky Mccall  Age: 59 y.o.  Gender: male  : 1965  MRN: [id number]  Visit Number: [id number]  Admit Date: 2024  Date of Service: 24    Patient Care Team:  Abel Sweeney DO as PCP - General (Family Medicine)    Reason for Consultation: Possible small bowel obstruction    Chief complaint : Abdominal pain      History of Present Illness:     Franky Mccall is a 59 y.o. male patient who with past surgical history significant for paraesophageal hernia repair with mesh about 2 years ago, who presented to the emergency department overnight with the acute onset of abdominal pain following a dinner at a [employer] restaurant and drinking carbonated beverages, which he tells me he usually does not do.  After he completed his dinner and drink 2 glasses of Pepsi, he developed significant abdominal distention and tenderness.  He has no history of a small bowel obstruction.  He reports having normal bowel movement earlier in the day.  He initially thought that his pain was secondary to gas, but given his history of paraesophageal hernia repair with mesh, and a fundoplication, he does not vomit or have the ability to belch.  He eventually felt unwell enough to come to the emergency department for further evaluation.  On initial evaluation he was noted to be hemodynamically normal with unremarkable labs.  CT scan of the abdomen pelvis was performed, and raise concern for a small bowel obstruction.  A nasogastric tube was inserted in the emergency department, and he was admitted by the hospitalist service.  He has begun to pass flatus overnight and this morning, and is feeling much better at the time of my evaluation.  He does have a nasogastric tube in place, but his wife, who is a nurse, states that it has never been hooked to suction since insertion.       Patient Active Problem List   Diagnosis    Anemia due to blood loss, acute    Iron deficiency anemia due  to chronic blood loss    Paraesophageal hernia    SBO (small bowel obstruction)         Past Medical History:   Diagnosis Date    Esophageal hernia     GERD (gastroesophageal reflux disease)     History of transfusion     July 2022 [facility]       Past Surgical History:   Procedure Laterality Date    COLONOSCOPY N/A 7/9/2022    Procedure: COLONOSCOPY;  Surgeon: Mónica Brambila MD;  Location: [facility] ENDOSCOPY;  Service: Gastroenterology;  Laterality: N/A;    ENDOSCOPY N/A 7/9/2022    Procedure: ESOPHAGOGASTRODUODENOSCOPY with biopsy;  Surgeon: Mónica Brambila MD;  Location:  [facility]OSCOPY;  Service: Gastroenterology;  Laterality: N/A;    ENDOSCOPY W/ PEG TUBE PLACEMENT N/A 12/13/2022    Procedure: ESOPHAGOGASTRODUODENOSCOPY WITH PERCUTANEOUS ENDOSCOPIC GASTROSTOMY TUBE INSERTION;  Surgeon: Faisal Juarez MD;  Location:  [facility];  Service: General;  Laterality: N/A;    ESOPHAGEAL MOTILITY STUDY N/A 9/8/2022    Procedure: MANOMETRY;  Surgeon: Faisal Juarez MD;  Location:  [facility]OS[facility];  Service: General;  Laterality: N/A;    PARAESOPHAGEAL HERNIA REPAIR N/A 12/13/2022    Procedure: PARAESOPHAGEAL HERNIA REPAIR WITH MESH LAPAROSCOPIC, NISSEN;  Surgeon: Faisal Juarez MD;  Location:  [facility];  Service: General;  Laterality: N/A;       Family History   Problem Relation Age of Onset    Colon cancer Neg Hx        Social History     Socioeconomic History    Marital status:    Tobacco Use    Smoking status: Never    Smokeless tobacco: Never   Vaping Use    Vaping status: Never Used   Substance and Sexual Activity    Alcohol use: Not Currently    Drug use: No     Comment: social    Sexual activity: Defer         Current Facility-Administered Medications:     sennosides-docusate (PERICOLACE) 8.6-50 MG per tablet 2 tablet, 2 tablet, Oral, BID PRN **AND** polyethylene glycol (MIRALAX) packet 17 g, 17 g, Oral, Daily PRN **AND** bisacodyl (DULCOLAX) EC tablet 5 mg, 5 mg, Oral,  Daily PRN **AND** bisacodyl (DULCOLAX) suppository 10 mg, 10 mg, Rectal, Daily PRN, Osbaldo Best DO    Enoxaparin Sodium (LOVENOX) syringe 40 mg, 40 mg, Subcutaneous, Q24H, Osbaldo Best DO, 40 mg at 06/29/24 0218    Morphine sulfate (PF) injection 2 mg, 2 mg, Intravenous, Q4H PRN **AND** naloxone (NARCAN) injection 0.4 mg, 0.4 mg, Intravenous, Q5 Min PRN, Osbaldo Best DO    ondansetron (ZOFRAN) injection 4 mg, 4 mg, Intravenous, Q6H PRN, Osbaldo Best DO    Pharmacy to Dose enoxaparin (LOVENOX), , Does not apply, Continuous PRN, Osbaldo Best DO    sodium chloride 0.9 % flush 10 mL, 10 mL, Intravenous, PRN, Emergency, Triage Protocol, MD    sodium chloride 0.9 % flush 10 mL, 10 mL, Intravenous, Q12H, Osbaldo Best DO    sodium chloride 0.9 % flush 10 mL, 10 mL, Intravenous, PRN, Osbaldo Best DO    sodium chloride 0.9 % infusion 40 mL, 40 mL, Intravenous, PRN, Osbaldo Best DO    sodium chloride 0.9 % infusion, 100 mL/hr, Intravenous, Continuous, Osbaldo Best DO, Last Rate: 100 mL/hr at 06/29/24 1304, 100 mL/hr at 06/29/24 1304    Current Outpatient Medications:     docusate (COLACE) 50 MG/5ML liquid, Take 10 mL by mouth Daily., Disp: 200 mL, Rfl: 0    HYDROcodone-acetaminophen (HYCET) 7.5-325 MG/15ML solution, Take 15 mL by mouth Every 4 (Four) Hours As Needed for breakthrough pain, Disp: 240 mL, Rfl: 0    ondansetron (ZOFRAN) 4 MG tablet, Take 1 tablet by mouth Every 6 (Six) Hours As Needed for Nausea or Vomiting., Disp: 60 tablet, Rfl: 0    pantoprazole (Protonix) 40 MG EC tablet, Take 1 tablet by mouth Daily., Disp: 30 tablet, Rfl: 3    simethicone (MYLICON) 80 MG chewable tablet, Chew 1 tablet 4 (Four) Times a Day As Needed for Flatulence (bloating)., Disp: 60 tablet, Rfl: 1    (Not in a hospital admission)      No Known Allergies    Review of Systems   Constitutional:  Negative for chills, fever and unexpected weight change.   HENT:  Negative for trouble swallowing and voice  change.    Eyes:  Negative for visual disturbance.   Respiratory:  Negative for apnea, cough, chest tightness, shortness of breath and wheezing.    Cardiovascular:  Negative for chest pain, palpitations and leg swelling.   Gastrointestinal:  Positive for abdominal distention, abdominal pain and constipation. Negative for anal bleeding, blood in stool, diarrhea, nausea, rectal pain and vomiting.   Endocrine: Negative for cold intolerance and heat intolerance.   Genitourinary:  Negative for difficulty urinating, dysuria, flank pain, scrotal swelling and testicular pain.   Musculoskeletal:  Negative for back pain, gait problem and joint swelling.   Skin:  Negative for color change, rash and wound.   Neurological:  Negative for dizziness, syncope, speech difficulty, weakness, numbness and headaches.   Hematological:  Negative for adenopathy. Does not bruise/bleed easily.   Psychiatric/Behavioral:  Negative for confusion. The patient is not nervous/anxious.        OBJECTIVE:     Vital Signs  Temp:  [97.5 °F (36.4 °C)] 97.5 °F (36.4 °C)  Heart Rate:  [61-71] 63  Resp:  [18] 18  BP: (122-138)/(81-94) 126/81    I/O this shift:  In: -   Out: 825 [Urine:825]  I/O last 3 completed shifts:  In: 500 [IV Piggyback:500]  Out: -       Physical Exam:      General Appearance:    Alert, cooperative, in no acute distress   Head:    Normocephalic, without obvious abnormality, atraumatic   Eyes:            Lids and lashes normal, conjunctivae and sclerae normal, no icterus   Ears:    Ears appear intact with no abnormalities noted   Lungs:     Respirations regular, even and unlabored    Heart:    Regular rhythm and normal rate   Abdomen:     Soft, non-tender, non-distended, no guarding, no rebound   tenderness   Genitalia:    Deferred   Extremities:   Moves all extremities well, no edema, no cyanosis, no  redness   Pulses:   Pulses palpable and equal bilaterally   Skin:   No bleeding, bruising or rash   Neurologic:   AAOx3, no gross  deficits         Results Review:  I have reviewed the entirety of the patient's clinical lab results.  I have also personally reviewed the patient's imaging  FINAL REPORT     TECHNIQUE:  Axial CT images were performed from the lung bases through the  symphysis pubis after the administration of intravenous  contrast.  This study was performed with techniques to keep  radiation doses as low as reasonably achievable (ALARA).  Individualized dose reduction techniques using automated  exposure control or adjustment of mA and/or kV according to the  patient's size were employed.     CLINICAL HISTORY:  Upper abdominal pain/ umbilical pain that began this evening.  Pain worse on LT side  Hx. hiatal hernia repair 2 years ago     FINDINGS:  LOWER CHEST: The heart is normal size.  The lung bases are  clear.  ABDOMEN/PELVIS:  Liver, gallbladder and bile ducts: The  liver enhances homogeneously without suspicious focal hepatic  lesion.  The gallbladder is unremarkable.  There is no definite  biliary duct dilatation.  Adrenal glands: The adrenal glands are  morphologically unremarkable without suspicious lesion.  Kidneys, ureter and urinary bladder: No suspicious renal lesion.  No hydronephrosis.  Urinary bladder is unremarkable.  Spleen:  The spleen is normal size.  Pancreas: The pancreas is grossly  unremarkable.  GI systems and mesentery: The patient status post  gastric fundoplication.  There are multiple dilated gas and  fluid-filled segments of small bowel.  Distal small bowel and  colon are normal in caliber.  There appears to be a transition  point in the left lower quadrant.  Image 78 series 2.  The  appendix is visualized and unremarkable in appearance.  No  significant mesenteric inflammation.  Lymph nodes: No definite  pathologically enlarged abdominal or pelvic lymph nodes present.  Vessels: The aorta and abdominal arteries are grossly patent.  The IVC and portal vein are patent and grossly unremarkable.  Peritoneum:  No free intraperitoneal fluid or pneumoperitoneum.  Pelvic viscera: No acute findings.  Body wall: No body wall  contusion. No significant body wall hernias.  Bones: No acute  fracture.     IMPRESSION:  Small bowel obstruction.     Authenticated and Electronically Signed by Vicente Garcia MD on  06/28/2024 11:54:04 PM    Lab Results (last 72 hours)       Procedure Component Value Units Date/Time    Basic Metabolic Panel [[id number]]  (Abnormal) Collected: 06/29/24 0615    Specimen: Blood Updated: 06/29/24 0652     Glucose 103 mg/dL      BUN 18 mg/dL      Creatinine 0.83 mg/dL      Sodium 140 mmol/L      Potassium 4.2 mmol/L      Chloride 107 mmol/L      CO2 24.5 mmol/L      Calcium 8.5 mg/dL      BUN/Creatinine Ratio 21.7     Anion Gap 8.5 mmol/L      eGFR 101.4 mL/min/1.73     Narrative:      GFR Normal >60  Chronic Kidney Disease <60  Kidney Failure <15      CBC Auto Differential [[id number]]  (Abnormal) Collected: 06/29/24 0615    Specimen: Blood Updated: 06/29/24 0633     WBC 8.39 10*3/mm3      RBC 4.74 10*6/mm3      Hemoglobin 14.5 g/dL      Hematocrit 44.4 %      MCV 93.7 fL      MCH 30.6 pg      MCHC 32.7 g/dL      RDW 12.5 %      RDW-SD 43.6 fl      MPV 9.4 fL      Platelets 195 10*3/mm3      Neutrophil % 80.5 %      Lymphocyte % 11.8 %      Monocyte % 6.3 %      Eosinophil % 0.7 %      Basophil % 0.5 %      Immature Grans % 0.2 %      Neutrophils, Absolute 6.75 10*3/mm3      Lymphocytes, Absolute 0.99 10*3/mm3      Monocytes, Absolute 0.53 10*3/mm3      Eosinophils, Absolute 0.06 10*3/mm3      Basophils, Absolute 0.04 10*3/mm3      Immature Grans, Absolute 0.02 10*3/mm3      nRBC 0.0 /100 WBC     Comprehensive Metabolic Panel [[id number]]  (Abnormal) Collected: 06/28/24 2249    Specimen: Blood Updated: 06/28/24 231     Glucose 103 mg/dL      BUN 20 mg/dL      Creatinine 1.00 mg/dL      Sodium 140 mmol/L      Potassium 4.0 mmol/L      Comment: Slight hemolysis detected by analyzer. Result may be falsely  elevated.        Chloride 106 mmol/L      CO2 24.5 mmol/L      Calcium 8.9 mg/dL      Total Protein 7.1 g/dL      Albumin 4.3 g/dL      ALT (SGPT) 9 U/L      AST (SGOT) 21 U/L      Alkaline Phosphatase 84 U/L      Total Bilirubin 0.5 mg/dL      Globulin 2.8 gm/dL      A/G Ratio 1.5 g/dL      BUN/Creatinine Ratio 20.0     Anion Gap 9.5 mmol/L      eGFR 87.2 mL/min/1.73     Narrative:      GFR Normal >60  Chronic Kidney Disease <60  Kidney Failure <15      Lipase [[id number]]  (Normal) Collected: 06/28/24 2249    Specimen: Blood Updated: 06/28/24 2315     Lipase 14 U/L     Lactic Acid, Plasma [[id number]]  (Normal) Collected: 06/28/24 2249    Specimen: Blood Updated: 06/28/24 2308     Lactate 0.6 mmol/L     Urinalysis, Microscopic Only - Urine, Clean Catch [[id number]]  (Abnormal) Collected: 06/28/24 2255    Specimen: Urine, Clean Catch Updated: 06/28/24 2306     RBC, UA None Seen /HPF      WBC, UA 3-5 /HPF      Bacteria, UA 1+ /HPF      Squamous Epithelial Cells, UA 0-2 /HPF      Hyaline Casts, UA None Seen /LPF      Methodology Manual Light Microscopy    Urinalysis With Microscopic If Indicated (No Culture) - Urine, Clean Catch [[id number]]  (Abnormal) Collected: 06/28/24 2255    Specimen: Urine, Clean Catch Updated: 06/28/24 2301     Color, UA Yellow     Appearance, UA Clear     pH, UA 6.0     Specific Gravity, UA 1.023     Glucose, UA Negative     Ketones, UA Negative     Bilirubin, UA Negative     Blood, UA Negative     Protein, UA Negative     Leuk Esterase, UA Trace     Nitrite, UA Negative     Urobilinogen, UA 0.2 E.U./dL    [place] Draw [[id number]] Collected: 06/28/24 2249    Specimen: Blood Updated: 06/28/24 2300    Narrative:      The following orders were created for panel order [place] Draw.  Procedure                               Abnormality         Status                     ---------                               -----------         ------                     Green Top (Gel)[[id number]]                                   Final result               Lavender Top[[id number]]                                     Final result               Gold Top - SST[[id number]]                                   Final result               Light Blue Top[[id number]]                                   Final result                 Please view results for these tests on the individual orders.    Green Top (Gel) [[id number]] Collected: 06/28/24 2249    Specimen: Blood Updated: 06/28/24 2300     Extra Tube Hold for add-ons.     Comment: Auto resulted.       Lavender Top [[id number]] Collected: 06/28/24 2249    Specimen: Blood Updated: 06/28/24 2300     Extra Tube hold for add-on     Comment: Auto resulted       Light Blue Top [[id number]] Collected: 06/28/24 2249    Specimen: Blood Updated: 06/28/24 2300     Extra Tube Hold for add-ons.     Comment: Auto resulted       Gold Top - SST [[id number]] Collected: 06/28/24 2249    Specimen: Blood Updated: 06/28/24 2300     Extra Tube Hold for add-ons.     Comment: Auto resulted.       CBC & Differential [[id number]]  (Normal) Collected: 06/28/24 2249    Specimen: Blood Updated: 06/28/24 2255    Narrative:      The following orders were created for panel order CBC & Differential.  Procedure                               Abnormality         Status                     ---------                               -----------         ------                     CBC Auto Differential[[id number]]        Normal              Final result                 Please view results for these tests on the individual orders.    CBC Auto Differential [[id number]]  (Normal) Collected: 06/28/24 2249    Specimen: Blood Updated: 06/28/24 2255     WBC 8.30 10*3/mm3      RBC 4.70 10*6/mm3      Hemoglobin 14.7 g/dL      Hematocrit 43.2 %      MCV 91.9 fL      MCH 31.3 pg      MCHC 34.0 g/dL      RDW 12.6 %      RDW-SD 42.5 fl      MPV 9.7 fL      Platelets 241 10*3/mm3      Neutrophil % 62.2 %      Lymphocyte % 25.5 %      Monocyte % 8.1 %       Eosinophil % 3.0 %      Basophil % 0.8 %      Immature Grans % 0.4 %      Neutrophils, Absolute 5.16 10*3/mm3      Lymphocytes, Absolute 2.12 10*3/mm3      Monocytes, Absolute 0.67 10*3/mm3      Eosinophils, Absolute 0.25 10*3/mm3      Basophils, Absolute 0.07 10*3/mm3      Immature Grans, Absolute 0.03 10*3/mm3      nRBC 0.0 /100 WBC                             ASSESSMENT/PLAN:      SBO (small bowel obstruction)    Mr. Maguire is a 59-year-old gentleman admitted with concerns for a partial small bowel obstruction.  Certainly, this may have been the case, however, alternatively he may have simply developed severe gaseous bloating after carbonated beverage ingestion given his past surgical history.  He could have also had an element of ileus.  Either way, he is improving and is now passing flatus.  His abdominal pain and distention has resolved.  I did hold his NG to suction and there was minimal output over the few minutes I was in the room with the patient.  We will get a repeat KUB to reassess his bowel gas pattern and reassess for possible discharge home on clear liquids later today.    Deanna Ashley MD  06/29/24  15:18 EDT

## 2024-06-29 NOTE — DISCHARGE SUMMARY
[facility] HOSPITALIST   DISCHARGE SUMMARY      Name:  Franky Mccall   Age:  59 y.o.  Sex:  male  :  1965  MRN:  [id number]   Visit Number:  [id number]    Admission Date:  2024  Date of Discharge:  2024  Primary Care Physician:  Abel Sweeney, DO    Important issues to note:    Clear liquid diet upon discharge, discussed advance slowly as tolerated.  He can follow-up with Dr. Ashley or general surgery in [place] if he has any recurrence of symptoms or other issues  Follow-up with Dr. Sweeney otherwise.    Discharge Diagnoses:     Concern for partial SBO  History of paraesophageal hernia/gastric fundoplication  GERD    Problem List:     Active Hospital Problems    Diagnosis  POA    **SBO (small bowel obstruction) [K56.609]  Yes      Resolved Hospital Problems   No resolved problems to display.     Presenting Problem:    Chief Complaint   Patient presents with    Abdominal Pain      Consults:     Consulting Physician(s)         Provider   Role Specialty     Deanna Ashley MD      Consulting Physician General Surgery          Procedures Performed:        History of presenting illness/Hospital Course:    Patient is a pleasant 59-year-old with a history of a paraesophageal hernia status post prior repair, GERD, who had presented from home with complaints of abdominal pain.  Patient indicates that he had been eating at outlying restaurant, had drank a couple of diet Pepsi's for which he does not typically, it felt like he needed to have a bowel movement on the way home, but then noted distention of his abdomen and thus come to the emergency room for workup.  He denies any known sick contacts.  No history of surgeries other than for paraesophageal hernia done by Dr. Juarez 2 years ago.    In the ER he was hemodynamically stable.  His labs and workup were largely unremarkable.  His CT scan abdomen pelvis was concerning for possible small bowel obstruction.  He was ordered  an NG tube in the ER and was admitted to the hospitalist service for general surgery consultation.    At time of visit this morning, the patient was awake alert and oriented and had no significant complaints.  He notes he had been passing gas, he felt no abdominal pain now, was requesting to have NG tube removed and potentially go home if possible.  Dr. Ashley did evaluate the patient, had a repeat KUB, and showed resolving and improving bowel gas pattern.  Of note, the patient's NG tube was not hooked to suction at time of my visit or at time of Dr. Ashley's visit, unsure if received suction overnight.  Irregardless, he had no significant NG tube output this afternoon and subsequently it was removed.  He will be ordered a clear liquid diet and instructions to advance diet as tolerated upon discharge.    Patient can follow-up with general surgery/Dr. Ashley's office if has any recurrence of symptoms or issues moving forward, otherwise follow-up with PCP.    Vital Signs:    Temp:  [97.5 °F (36.4 °C)] 97.5 °F (36.4 °C)  Heart Rate:  [61-71] 63  Resp:  [18] 18  BP: (108-138)/(81-94) 108/91    Physical Exam:    General Appearance:  Alert and cooperative.  NAD   Head:  Atraumatic and normocephalic.   Eyes: Conjunctivae and sclerae normal, no icterus. No pallor.   Ears:  Ears with no abnormalities noted.   Throat: No oral lesions, no thrush, oral mucosa moist.   Neck: Supple, trachea midline, no thyromegaly.   Back:   No kyphoscoliosis present. No tenderness to palpation.   Lungs:   Breath sounds heard bilaterally equally.  No crackles or wheezing. No Pleural rub or bronchial breathing.   Heart:  Normal S1 and S2, no murmur, no gallop, no rub. No JVD.   Abdomen:   Normal bowel sounds, no masses, no organomegaly. Soft, nontender, nondistended, no rebound tenderness.   Extremities: Supple, no edema, no cyanosis, no clubbing.   Pulses: Pulses palpable bilaterally.   Skin: No bleeding or rash.   Neurologic: Alert and oriented x  3. No facial asymmetry. Moves all four limbs. No tremors.     Pertinent Lab Results:     Results from last 7 days   Lab Units 06/29/24  0615 06/28/24  2249   SODIUM mmol/L 140 140   POTASSIUM mmol/L 4.2 4.0   CHLORIDE mmol/L 107 106   CO2 mmol/L 24.5 24.5   BUN mg/dL 18 20   CREATININE mg/dL 0.83 1.00   CALCIUM mg/dL 8.5* 8.9   BILIRUBIN mg/dL  --  0.5   ALK PHOS U/L  --  84   ALT (SGPT) U/L  --  9   AST (SGOT) U/L  --  21   GLUCOSE mg/dL 103* 103*     Results from last 7 days   Lab Units 06/29/24  0615 06/28/24  2249   WBC 10*3/mm3 8.39 8.30   HEMOGLOBIN g/dL 14.5 14.7   HEMATOCRIT % 44.4 43.2   PLATELETS 10*3/mm3 195 241                     Results from last 7 days   Lab Units 06/28/24  2249   LIPASE U/L 14               Pertinent Radiology Results:    Imaging Results (All)       Procedure Component Value Units Date/Time    XR Abdomen KUB [[id number]] Collected: 06/29/24 1447     Updated: 06/29/24 1450    Narrative:      PROCEDURE: XR ABDOMEN KUB-     INDICATION:  Reevaluate SBO; K56.609-Unspecified intestinal obstruction,  unspecified as to partial versus complete obstruction; R10.13-Epigastric  pain     COMPARISON: 12 hours prior.     FINDINGS:  A supine view of the abdomen was obtained.  The bowel gas  pattern is normal.  There are no pathologic calcifications.  No acute  osseous abnormality is identified. NG tube identified with tip in the  proximal stomach, similar to prior exam.       Impression:      NG tube in place with no small or large bowel gaseous  distention seen consistent with resolving/improving small bowel  obstruction...              This report was signed and finalized on 6/29/2024 2:48 PM by Iliana Eden MD.       XR Abdomen KUB [[id number]] Collected: 06/29/24 0145     Updated: 06/29/24 0146    Narrative:      FINAL REPORT    TECHNIQUE:  null    CLINICAL HISTORY:  NG tube verification    COMPARISON:  null    FINDINGS:  EXAM: 1 VIEW ABDOMEN.    Comparison: CT/SR - CT ABDOMEN PELVIS W CONTRAST -  06/28/2024 11:04 PM EDT    FINDINGS:    NG tube satisfactory tip over the gastric body level.    Again noted are dilated small bowel loops compatible with obstruction.    Visceral shadows obscured by bowel gas.    No acute osseous abnormalities are appreciated.      Impression:      IMPRESSION:    NG tube satisfactory.    Dilated small bowel loops compatible with obstruction.    Authenticated and Electronically Signed by Rhona Barnes MD  on 06/29/2024 01:45:12 AM    CT Abdomen Pelvis With Contrast [[id number]] Collected: 06/28/24 2349     Updated: 06/28/24 2355    Narrative:      FINAL REPORT    TECHNIQUE:  Axial CT images were performed from the lung bases through the  symphysis pubis after the administration of intravenous  contrast.  This study was performed with techniques to keep  radiation doses as low as reasonably achievable (ALARA).  Individualized dose reduction techniques using automated  exposure control or adjustment of mA and/or kV according to the  patient's size were employed.    CLINICAL HISTORY:  Upper abdominal pain/ umbilical pain that began this evening.  Pain worse on LT side  Hx. hiatal hernia repair 2 years ago    FINDINGS:  LOWER CHEST: The heart is normal size.  The lung bases are  clear.  ABDOMEN/PELVIS:  Liver, gallbladder and bile ducts: The  liver enhances homogeneously without suspicious focal hepatic  lesion.  The gallbladder is unremarkable.  There is no definite  biliary duct dilatation.  Adrenal glands: The adrenal glands are  morphologically unremarkable without suspicious lesion.  Kidneys, ureter and urinary bladder: No suspicious renal lesion.  No hydronephrosis.  Urinary bladder is unremarkable.  Spleen:  The spleen is normal size.  Pancreas: The pancreas is grossly  unremarkable.  GI systems and mesentery: The patient status post  gastric fundoplication.  There are multiple dilated gas and  fluid-filled segments of small bowel.  Distal small bowel and  colon are normal in  caliber.  There appears to be a transition  point in the left lower quadrant.  Image 78 series 2.  The  appendix is visualized and unremarkable in appearance.  No  significant mesenteric inflammation.  Lymph nodes: No definite  pathologically enlarged abdominal or pelvic lymph nodes present.  Vessels: The aorta and abdominal arteries are grossly patent.  The IVC and portal vein are patent and grossly unremarkable.  Peritoneum: No free intraperitoneal fluid or pneumoperitoneum.  Pelvic viscera: No acute findings.  Body wall: No body wall  contusion. No significant body wall hernias.  Bones: No acute  fracture.      Impression:      Small bowel obstruction.    Authenticated and Electronically Signed by Vicente Garcia MD on  06/28/2024 11:54:04 PM            Echo:      Condition on Discharge:      Stable.    Code status during the hospital stay:    Code Status and Medical Interventions:   Ordered at: 06/29/24 0049     Code Status (Patient has no pulse and is not breathing):    CPR (Attempt to Resuscitate)     Medical Interventions (Patient has pulse or is breathing):    Full Support     Discharge Disposition:    Home or Self Care    Discharge Medications:       Discharge Medications        Stop These Medications      Docu 50 MG/5ML syrup  Generic drug: Docusate Sodium     HYDROcodone-acetaminophen 7.5-325 MG/15ML solution  Commonly known as: HYCET     ondansetron 4 MG tablet  Commonly known as: ZOFRAN     pantoprazole 40 MG EC tablet  Commonly known as: Protonix     simethicone 80 MG chewable tablet  Commonly known as: MYLICON            Discharge Diet:     Diet Instructions       Diet: Liquid Diets; Clear Liquid; Thin (IDDSI 0)      Discharge Diet: Liquid Diets    Liquid Diet: Clear Liquid    Fluid Consistency: Thin (IDDSI 0)          Activity at Discharge:       Follow-up Appointments:     Follow-up Information       Abel Sweeney DO .    Specialty: Family Medicine  Contact information:  [address] [place] [address]  Floor  [place] [address]  [phone]               Deanna Ashley MD. Call.    Specialty: General Surgery  Why: As needed, If symptoms worsen  Contact information:  [address]  [address]  [facility] [id number] [phone]                           Future Appointments   Date Time Provider Department Center   8/8/2024  8:30 AM Madi Dominguez MD [facility]     Test Results Pending at Discharge:           Jessica Mclaughlin DO  06/29/24  16:54 EDT    Time: I spent 17 minutes on this discharge activity which included: face-to-face encounter with the patient, reviewing the data in the system, coordination of the care with the nursing staff as well as consultants, documentation, and entering orders.     Dictated utilizing Dragon dictation.

## 2024-06-29 NOTE — ED PROVIDER NOTES
EMERGENCY DEPARTMENT ENCOUNTER    Pt Name: Franky Mccall  MRN: [id number]  Pt :   1965  Room Number:    Date of encounter:  2024  PCP: Abel Sweeney DO  ED Provider: Merritt De Santiago MD    Historian: Patient      HPI:  Chief Complaint: Abdominal pain        Context: Franky Mccall is a 59 y.o. male who presents to the ED c/o abdominal pain.  Patient states that he has had upper abdominal pain all afternoon/evening.  Pain woke him up from sleep.  States that he is not supposed to drink carbonated beverages but had 2 diet Pepsi's at lunch today.  States he has history of hiatal hernia repair 2 years ago and has had no other issues since the surgery.  States he feels like all of his pain is in his upper abdomen and feels like he is full.  Has not been able to belch or pass gas.  Denies any vomiting or diarrhea.  Denies any fevers.      PAST MEDICAL HISTORY  Past Medical History:   Diagnosis Date    Esophageal hernia     GERD (gastroesophageal reflux disease)     History of transfusion     2022 [facility]         PAST SURGICAL HISTORY  Past Surgical History:   Procedure Laterality Date    COLONOSCOPY N/A 2022    Procedure: COLONOSCOPY;  Surgeon: Mónica Brabmila MD;  Location: [facility] ENDOSCOPY;  Service: Gastroenterology;  Laterality: N/A;    ENDOSCOPY N/A 2022    Procedure: ESOPHAGOGASTRODUODENOSCOPY with biopsy;  Surgeon: Mónica Brambila MD;  Location: [facility] ENDOSCOPY;  Service: Gastroenterology;  Laterality: N/A;    ENDOSCOPY W/ PEG TUBE PLACEMENT N/A 2022    Procedure: ESOPHAGOGASTRODUODENOSCOPY WITH PERCUTANEOUS ENDOSCOPIC GASTROSTOMY TUBE INSERTION;  Surgeon: Faisal Juarez MD;  Location:  [facility] OR;  Service: General;  Laterality: N/A;    ESOPHAGEAL MOTILITY STUDY N/A 2022    Procedure: MANOMETRY;  Surgeon: Faisal Juarez MD;  Location:  [facility] ENDOSCOPY;  Service: General;  Laterality: N/A;    PARAESOPHAGEAL HERNIA REPAIR  N/A 12/13/2022    Procedure: PARAESOPHAGEAL HERNIA REPAIR WITH MESH LAPAROSCOPIC, NISSEN;  Surgeon: Faisal Juarez MD;  Location: [facility];  Service: General;  Laterality: N/A;         FAMILY HISTORY  Family History   Problem Relation Age of Onset    Colon cancer Neg Hx          SOCIAL HISTORY  Social History     Socioeconomic History    Marital status:    Tobacco Use    Smoking status: Never    Smokeless tobacco: Never   Vaping Use    Vaping status: Never Used   Substance and Sexual Activity    Alcohol use: Not Currently    Drug use: No     Comment: social    Sexual activity: Defer         ALLERGIES  Patient has no known allergies.        REVIEW OF SYSTEMS  Review of Systems     All systems reviewed and negative except for those discussed in HPI.       PHYSICAL EXAM    I have reviewed the triage vital signs and nursing notes.    ED Triage Vitals [06/28/24 2238]   Temp Heart Rate Resp BP SpO2   97.5 °F (36.4 °C) 70 18 134/83 99 %      Temp src Heart Rate Source Patient Position BP Location FiO2 (%)   Oral Monitor Sitting Left arm --       Physical Exam    General:  Awake, alert, no acute distress  HEENT: Atraumatic, normocephalic, EOMI, PERRLA, mucous membranes moist  NECK:  Supple, atraumatic  Cardiovascular:  Regular rate, regular rhythm, no murmurs, rubs, or gallops.  Extremities well perfused   Respiratory:  Regular rate, clear lungs to auscultation bilaterally.  No rhonchi, rales, wheezing  Abdominal:  Soft, nondistended, tenderness of epigastrium and left upper quadrant.  No guarding or rebound.  No palpable masses  Extremity:  No visible bony abnormalities in all 4 extremities.  Full range of motion of all extremities.  Skin:  Warm and dry.  No rashes  Neuro:  AAOx3, GCS 15. Cranial nerves 2-12 grossly intact.  No focal strength or sensation deficits.  Psych:  Mood and affect appropriate.        LAB RESULTS  Recent Results (from the past 24 hour(s))   Comprehensive Metabolic Panel    Collection  Time: 06/28/24 10:49 PM    Specimen: Blood   Result Value Ref Range    Glucose 103 (H) 65 - 99 mg/dL    BUN 20 6 - 20 mg/dL    Creatinine 1.00 0.76 - 1.27 mg/dL    Sodium 140 136 - 145 mmol/L    Potassium 4.0 3.5 - 5.2 mmol/L    Chloride 106 98 - 107 mmol/L    CO2 24.5 22.0 - 29.0 mmol/L    Calcium 8.9 8.6 - 10.5 mg/dL    Total Protein 7.1 6.0 - 8.5 g/dL    Albumin 4.3 3.5 - 5.2 g/dL    ALT (SGPT) 9 1 - 41 U/L    AST (SGOT) 21 1 - 40 U/L    Alkaline Phosphatase 84 39 - 117 U/L    Total Bilirubin 0.5 0.0 - 1.2 mg/dL    Globulin 2.8 gm/dL    A/G Ratio 1.5 g/dL    BUN/Creatinine Ratio 20.0 7.0 - 25.0    Anion Gap 9.5 5.0 - 15.0 mmol/L    eGFR 87.2 >60.0 mL/min/1.73   Lipase    Collection Time: 06/28/24 10:49 PM    Specimen: Blood   Result Value Ref Range    Lipase 14 13 - 60 U/L   Lactic Acid, Plasma    Collection Time: 06/28/24 10:49 PM    Specimen: Blood   Result Value Ref Range    Lactate 0.6 0.5 - 2.0 mmol/L   Green Top (Gel)    Collection Time: 06/28/24 10:49 PM   Result Value Ref Range    Extra Tube Hold for add-ons.    Lavender Top    Collection Time: 06/28/24 10:49 PM   Result Value Ref Range    Extra Tube hold for add-on    Gold Top - SST    Collection Time: 06/28/24 10:49 PM   Result Value Ref Range    Extra Tube Hold for add-ons.    Light Blue Top    Collection Time: 06/28/24 10:49 PM   Result Value Ref Range    Extra Tube Hold for add-ons.    CBC Auto Differential    Collection Time: 06/28/24 10:49 PM    Specimen: Blood   Result Value Ref Range    WBC 8.30 3.40 - 10.80 10*3/mm3    RBC 4.70 4.14 - 5.80 10*6/mm3    Hemoglobin 14.7 13.0 - 17.7 g/dL    Hematocrit 43.2 37.5 - 51.0 %    MCV 91.9 79.0 - 97.0 fL    MCH 31.3 26.6 - 33.0 pg    MCHC 34.0 31.5 - 35.7 g/dL    RDW 12.6 12.3 - 15.4 %    RDW-SD 42.5 37.0 - 54.0 fl    MPV 9.7 6.0 - 12.0 fL    Platelets 241 140 - 450 10*3/mm3    Neutrophil % 62.2 42.7 - 76.0 %    Lymphocyte % 25.5 19.6 - 45.3 %    Monocyte % 8.1 5.0 - 12.0 %    Eosinophil % 3.0 0.3 - 6.2 %     Basophil % 0.8 0.0 - 1.5 %    Immature Grans % 0.4 0.0 - 0.5 %    Neutrophils, Absolute 5.16 1.70 - 7.00 10*3/mm3    Lymphocytes, Absolute 2.12 0.70 - 3.10 10*3/mm3    Monocytes, Absolute 0.67 0.10 - 0.90 10*3/mm3    Eosinophils, Absolute 0.25 0.00 - 0.40 10*3/mm3    Basophils, Absolute 0.07 0.00 - 0.20 10*3/mm3    Immature Grans, Absolute 0.03 0.00 - 0.05 10*3/mm3    nRBC 0.0 0.0 - 0.2 /100 WBC   Urinalysis With Microscopic If Indicated (No Culture) - Urine, Clean Catch    Collection Time: 06/28/24 10:55 PM    Specimen: Urine, Clean Catch   Result Value Ref Range    Color, UA Yellow Yellow, Straw    Appearance, UA Clear Clear    pH, UA 6.0 5.0 - 8.0    Specific Gravity, UA 1.023 1.005 - 1.030    Glucose, UA Negative Negative    Ketones, UA Negative Negative    Bilirubin, UA Negative Negative    Blood, UA Negative Negative    Protein, UA Negative Negative    Leuk Esterase, UA Trace (A) Negative    Nitrite, UA Negative Negative    Urobilinogen, UA 0.2 E.U./dL 0.2 - 1.0 E.U./dL   Urinalysis, Microscopic Only - Urine, Clean Catch    Collection Time: 06/28/24 10:55 PM    Specimen: Urine, Clean Catch   Result Value Ref Range    RBC, UA None Seen None Seen, 0-2 /HPF    WBC, UA 3-5 (A) None Seen, 0-2 /HPF    Bacteria, UA 1+ (A) None Seen /HPF    Squamous Epithelial Cells, UA 0-2 None Seen, 0-2 /HPF    Hyaline Casts, UA None Seen None Seen /LPF    Methodology Manual Light Microscopy        If labs were ordered, I independently reviewed the results and considered them in treating the patient.        RADIOLOGY  CT Abdomen Pelvis With Contrast    Result Date: 6/28/2024  FINAL REPORT TECHNIQUE: Axial CT images were performed from the lung bases through the symphysis pubis after the administration of intravenous contrast.  This study was performed with techniques to keep radiation doses as low as reasonably achievable (ALARA). Individualized dose reduction techniques using automated exposure control or adjustment of mA and/or  kV according to the patient's size were employed. CLINICAL HISTORY: Upper abdominal pain/ umbilical pain that began this evening. Pain worse on LT side  Hx. hiatal hernia repair 2 years ago FINDINGS: LOWER CHEST: The heart is normal size.  The lung bases are clear.  ABDOMEN/PELVIS:  Liver, gallbladder and bile ducts: The liver enhances homogeneously without suspicious focal hepatic lesion.  The gallbladder is unremarkable.  There is no definite biliary duct dilatation.  Adrenal glands: The adrenal glands are morphologically unremarkable without suspicious lesion. Kidneys, ureter and urinary bladder: No suspicious renal lesion. No hydronephrosis.  Urinary bladder is unremarkable.  Spleen: The spleen is normal size.  Pancreas: The pancreas is grossly unremarkable.  GI systems and mesentery: The patient status post gastric fundoplication.  There are multiple dilated gas and fluid-filled segments of small bowel.  Distal small bowel and colon are normal in caliber.  There appears to be a transition point in the left lower quadrant.  Image 78 series 2.  The appendix is visualized and unremarkable in appearance.  No significant mesenteric inflammation.  Lymph nodes: No definite pathologically enlarged abdominal or pelvic lymph nodes present. Vessels: The aorta and abdominal arteries are grossly patent. The IVC and portal vein are patent and grossly unremarkable. Peritoneum: No free intraperitoneal fluid or pneumoperitoneum. Pelvic viscera: No acute findings.  Body wall: No body wall contusion. No significant body wall hernias.  Bones: No acute fracture.     Small bowel obstruction. Authenticated and Electronically Signed by Vicente Garcia MD on 06/28/2024 11:54:04 PM     I ordered and independently reviewed the above noted radiographic studies.     See radiologist's dictation for official interpretation.        PROCEDURES    Procedures    No orders to display       MEDICATIONS GIVEN IN ER    Medications   sodium chloride  0.9 % flush 10 mL (has no administration in time range)   sodium chloride 0.9 % bolus 500 mL (0 mL Intravenous Stopped 6/29/24 0003)   aluminum-magnesium hydroxide-simethicone (MAALOX MAX) 400-400-40 MG/5ML suspension 15 mL (15 mL Oral Given 6/28/24 2318)   Lidocaine Viscous HCl (XYLOCAINE) 2 % solution 5 mL (5 mL Mouth/Throat Given 6/28/24 2318)   iopamidol (ISOVUE-300) 61 % injection 100 mL (100 mL Intravenous Given 6/28/24 2311)   morphine injection 4 mg (4 mg Intravenous Given 6/29/24 0054)         MEDICAL DECISION MAKING, PROGRESS, and CONSULTS    All labs, if obtained, have been independently reviewed by me.  All radiology studies, if obtained, have been reviewed by me and the radiologist dictating the report.  All EKG's, if obtained, have been independently viewed and interpreted by me.      Discussion below represents my analysis of pertinent findings related to patient's condition, differential diagnosis, treatment plan and final disposition.    Franky Mccall is a 59 y.o. male who presents to the ED c/o abdominal pain.  Hemodynamically stable nontoxic in appearance upon arrival.  Differential includes was not limited to gastritis, gas pains, pancreatitis, bowel obstruction, cholecystitis, cholelithiasis.  Labs ordered along with CT of abdomen pelvis with contrast.  Patient given IV fluid bolus of normal saline.  Patient also given GI cocktail viscous lidocaine and Maalox.      Following GI cocktail patient had no change in symptoms.  Labs show no significant leukocytosis.  Normal electrolyte panel, renal function panel and liver enzymes.  Normal lactate.  CT of abdomen pelvis obtained which shows evidence of small bowel obstruction with apparent transition point in the left lower quadrant.  Discussed results with patient who voiced understanding.  Patient given IV morphine for further pain control and NG tube was placed.  Consulted general surgeon on-call who is agreeable with this plan and will see  patient as an inpatient.  Patient admitted for further treatment.                                 Orders placed during this visit:  Orders Placed This Encounter   Procedures    CT Abdomen Pelvis With Contrast    [place] Draw    Comprehensive Metabolic Panel    Lipase    Urinalysis With Microscopic If Indicated (No Culture) - Urine, Clean Catch    Lactic Acid, Plasma    CBC Auto Differential    Urinalysis, Microscopic Only - Urine, Clean Catch    NPO Diet NPO Type: Strict NPO    Undress & Gown    Nasogastric tube insertion    Code Status and Medical Interventions:    Inpatient General Surgery Consult    Insert Peripheral IV    Inpatient Admission    CBC & Differential    Green Top (Gel)    Lavender Top    Gold Top - SST    Light Blue Top         ED Course:    Consultants:                  Shared Decision Making:  After my consideration of clinical presentation and any laboratory/radiology studies obtained, I discussed the findings with the patient/patient representative who is in agreement with the treatment plan and the final disposition.   Risks and benefits of discharge and/or observation/admission were discussed.      AS OF 00:59 EDT VITALS:    BP - 134/83  HR - 70  TEMP - 97.5 °F (36.4 °C) (Oral)  O2 SATS - 99%                  DIAGNOSIS  Final diagnoses:   Small bowel obstruction   Epigastric pain         DISPOSITION  Admit      Please note that portions of this document were completed with voice recognition software.        Merritt De Santiago MD  06/29/24 0059

## 2024-07-01 NOTE — PAYOR COMM NOTE
"TO:BC (COMMON SPIRIT)  FROM:JONATHON ANNE, RN PHONE [phone]  INPT NOTIFICATION DATE OF SERVICE 24-24  TAX ID [id number] NPI [id number]    Franky Arias Ala (59 y.o. Male)       Date of Birth   1965    Social Security Number       Address   [address] [place] [address] [address] [place] [address]    Home Phone   [phone]    MRN   [id number]       Religious   Patient Refused    Marital Status                               Admission Date   24    Admission Type   Emergency    Admitting Provider       Attending Provider       Department, Room/Bed   [facility] EMERGENCY DEPARTMENT,        Discharge Date   2024    Discharge Disposition   Home or Self Care    Discharge Destination   Home                              Attending Provider: (none)   Allergies: No Known Allergies    Isolation: None   Infection: None   Code Status: Prior    Ht: 180.3 cm (71\")   Wt: 86.2 kg (190 lb)    Admission Cmt: None   Principal Problem: SBO (small bowel obstruction) [K56.609]                   Active Insurance as of 2024       Primary Coverage       Payor Plan Insurance Group Employer/Plan Group    [employer] BLUE [employer]OSS [employer]UE [employer] BLUE [employer] PPO [id number]       Payor Plan Address Payor Plan Phone Number Payor Plan Fax Number Effective Dates    [address] [phone]  2022 - None Entered    [place] [address]         Subscriber Name Subscriber Birth Date Member ID       TAYLOR ARIAS 1955 [id number]                     Emergency Contacts        (Rel.) Home Phone Work Phone Mobile Phone    Flor Arias (Spouse) [phone] -- --                 History & Physical        Osbaldo Best DO at 24 0049            [facility] HOSPITALIST   HISTORY AND PHYSICAL      Name:  Franky Arias   Age:  59 y.o.  Sex:  male  :  1965  MRN:  [id number]   Visit Number:  [id number]  Admission Date:  2024  Date Of " Service:  06/29/24  Primary Care Physician:  Abel Sweeney DO    Chief Complaint:     Abdominal pain    History Of Presenting Illness:      Patient is a 59-year-old male with history significant for paraesophageal hernia status post repair with mesh 2 years ago.  Presents to the emergency room tonight with acute onset abdominal pain after drinking a carbonated beverage.  Patient reports he had a bowel movement earlier in the day but abdomen now significantly distended and tender.  Has never had an obstruction previously.    ED summary: Patient afebrile, hemodynamically stable and nonhypoxic on room air.  CMP CBC unremarkable.  Lactate lipase normal.  UA positive for leukocytes 1+ bacteria and WBC.  CT abdomen pelvis confirms small bowel obstruction.  NG tube placed in the ER.  Dr. Ashley with general surgery agreed to consult.  Hospitalist asked to admit.    Review Of Systems:    All systems were reviewed and negative except as mentioned in history of presenting illness, assessment and plan.    Past Medical History: Patient  has a past medical history of Esophageal hernia, GERD (gastroesophageal reflux disease), and History of transfusion.    Past Surgical History: Patient  has a past surgical history that includes Esophagogastroduodenoscopy (N/A, 7/9/2022); Colonoscopy (N/A, 7/9/2022); Esophageal motility study (N/A, 9/8/2022); Paraesophageal hernia repair (N/A, 12/13/2022); and Esophagogastroduodenoscopy w/ PEG (N/A, 12/13/2022).    Social History: Patient  reports that he has never smoked. He has never used smokeless tobacco. He reports that he does not currently use alcohol. He reports that he does not use drugs.    Family History:  Patient's family history has been reviewed and found to be noncontributory.     Allergies:      Patient has no known allergies.    Home Medications:    Prior to Admission Medications       Prescriptions Last Dose Informant Patient Reported? Taking?    docusate (COLACE) 50  "MG/5ML liquid   No No    Take 10 mL by mouth Daily.    HYDROcodone-acetaminophen (HYCET) 7.5-325 MG/15ML solution   No No    Take 15 mL by mouth Every 4 (Four) Hours As Needed for breakthrough pain    ondansetron (ZOFRAN) 4 MG tablet   No No    Take 1 tablet by mouth Every 6 (Six) Hours As Needed for Nausea or Vomiting.    pantoprazole (Protonix) 40 MG EC tablet   No No    Take 1 tablet by mouth Daily.    simethicone (MYLICON) 80 MG chewable tablet   No No    Chew 1 tablet 4 (Four) Times a Day As Needed for Flatulence (bloating).          ED Medications:    Medications   sodium chloride 0.9 % flush 10 mL (has no administration in time range)   morphine injection 4 mg (has no administration in time range)   sodium chloride 0.9 % bolus 500 mL (0 mL Intravenous Stopped 6/29/24 0003)   aluminum-magnesium hydroxide-simethicone (MAALOX MAX) 400-400-40 MG/5ML suspension 15 mL (15 mL Oral Given 6/28/24 2318)   Lidocaine Viscous HCl (XYLOCAINE) 2 % solution 5 mL (5 mL Mouth/Throat Given 6/28/24 2318)   iopamidol (ISOVUE-300) 61 % injection 100 mL (100 mL Intravenous Given 6/28/24 2311)     Vital Signs:  Temp:  [97.5 °F (36.4 °C)] 97.5 °F (36.4 °C)  Heart Rate:  [70] 70  Resp:  [18] 18  BP: (134)/(83) 134/83        06/28/24 2238   Weight: 86.2 kg (190 lb)     Body mass index is 26.5 kg/m².    Physical Exam:     Most recent vital Signs: /83 (BP Location: Left arm, Patient Position: Sitting)   Pulse 70   Temp 97.5 °F (36.4 °C) (Oral)   Resp 18   Ht 180.3 cm (71\")   Wt 86.2 kg (190 lb)   SpO2 99%   BMI 26.50 kg/m²     Physical Exam  Vitals reviewed.   Constitutional:       Appearance: He is normal weight.   HENT:      Head: Normocephalic and atraumatic.      Right Ear: External ear normal.      Left Ear: External ear normal.      Mouth/Throat:      Mouth: Mucous membranes are moist.      Pharynx: Oropharynx is clear.   Eyes:      Extraocular Movements: Extraocular movements intact.      Conjunctiva/sclera: " Conjunctivae normal.   Cardiovascular:      Rate and Rhythm: Normal rate and regular rhythm.      Pulses: Normal pulses.      Heart sounds: Normal heart sounds.   Pulmonary:      Effort: Pulmonary effort is normal.      Breath sounds: Normal breath sounds.   Abdominal:      General: There is distension.      Tenderness: There is abdominal tenderness.      Comments: Hypoactive bowel sounds   Musculoskeletal:         General: Normal range of motion.   Skin:     General: Skin is warm and dry.   Neurological:      General: No focal deficit present.      Mental Status: He is alert and oriented to person, place, and time.         Laboratory data:    I have reviewed the labs done in the emergency room.    Results from last 7 days   Lab Units 06/28/24  2249   SODIUM mmol/L 140   POTASSIUM mmol/L 4.0   CHLORIDE mmol/L 106   CO2 mmol/L 24.5   BUN mg/dL 20   CREATININE mg/dL 1.00   CALCIUM mg/dL 8.9   BILIRUBIN mg/dL 0.5   ALK PHOS U/L 84   ALT (SGPT) U/L 9   AST (SGOT) U/L 21   GLUCOSE mg/dL 103*     Results from last 7 days   Lab Units 06/28/24  2249   WBC 10*3/mm3 8.30   HEMOGLOBIN g/dL 14.7   HEMATOCRIT % 43.2   PLATELETS 10*3/mm3 241                     Results from last 7 days   Lab Units 06/28/24  2249   LIPASE U/L 14         Results from last 7 days   Lab Units 06/28/24  2255   COLOR UA  Yellow   GLUCOSE UA  Negative   KETONES UA  Negative   BLOOD UA  Negative   LEUKOCYTES UA  Trace*   PH, URINE  6.0   BILIRUBIN UA  Negative   UROBILINOGEN UA  0.2 E.U./dL   RBC UA /HPF None Seen   WBC UA /HPF 3-5*       Pain Management Panel           No data to display                EKG:          Radiology:    CT Abdomen Pelvis With Contrast    Result Date: 6/28/2024  FINAL REPORT TECHNIQUE: Axial CT images were performed from the lung bases through the symphysis pubis after the administration of intravenous contrast.  This study was performed with techniques to keep radiation doses as low as reasonably achievable (ALARA).  Individualized dose reduction techniques using automated exposure control or adjustment of mA and/or kV according to the patient's size were employed. CLINICAL HISTORY: Upper abdominal pain/ umbilical pain that began this evening. Pain worse on LT side  Hx. hiatal hernia repair 2 years ago FINDINGS: LOWER CHEST: The heart is normal size.  The lung bases are clear.  ABDOMEN/PELVIS:  Liver, gallbladder and bile ducts: The liver enhances homogeneously without suspicious focal hepatic lesion.  The gallbladder is unremarkable.  There is no definite biliary duct dilatation.  Adrenal glands: The adrenal glands are morphologically unremarkable without suspicious lesion. Kidneys, ureter and urinary bladder: No suspicious renal lesion. No hydronephrosis.  Urinary bladder is unremarkable.  Spleen: The spleen is normal size.  Pancreas: The pancreas is grossly unremarkable.  GI systems and mesentery: The patient status post gastric fundoplication.  There are multiple dilated gas and fluid-filled segments of small bowel.  Distal small bowel and colon are normal in caliber.  There appears to be a transition point in the left lower quadrant.  Image 78 series 2.  The appendix is visualized and unremarkable in appearance.  No significant mesenteric inflammation.  Lymph nodes: No definite pathologically enlarged abdominal or pelvic lymph nodes present. Vessels: The aorta and abdominal arteries are grossly patent. The IVC and portal vein are patent and grossly unremarkable. Peritoneum: No free intraperitoneal fluid or pneumoperitoneum. Pelvic viscera: No acute findings.  Body wall: No body wall contusion. No significant body wall hernias.  Bones: No acute fracture.     Small bowel obstruction. Authenticated and Electronically Signed by Vicente Garcia MD on 06/28/2024 11:54:04 PM     Assessment:    SBO  Asymptomatic bacteriuria  Paraesophageal hernia status post mesh repair  GERD    Plan:    SBO  -NG tube placement in the ER  -Pain  medication and antiemetics as indicated  -Gentle IV fluid  -Dr. Ashley General surgery consulted    Further orders as clinical course dictates.    Risk Assessment: Moderate  DVT Prophylaxis: Lovenox  Code Status: Full  Diet: N.p.o.          Osbaldo Best DO  24  00:49 EDT    Dictated utilizing Dragon dictation.      Electronically signed by Osbaldo Best DO at 24 0130          Emergency Department Notes        Sindy Shipley RN at 24 0122          Xray at bedside    Electronically signed by Sindy Shipley RN at 24 0122       Sindy Shipley RN at 24 2303          Pt to CT via wheelchair    Electronically signed by Sindy Shipley RN at 24 2303       Merritt De Santiago MD at 24 2253           EMERGENCY DEPARTMENT ENCOUNTER    Pt Name: Franky Mccall  MRN: [id number]  Pt :   1965  Room Number:    Date of encounter:  2024  PCP: Abel Sweeney DO  ED Provider: Merritt De Santiago MD    Historian: Patient      HPI:  Chief Complaint: Abdominal pain        Context: Franky Mccall is a 59 y.o. male who presents to the ED c/o abdominal pain.  Patient states that he has had upper abdominal pain all afternoon/evening.  Pain woke him up from sleep.  States that he is not supposed to drink carbonated beverages but had 2 diet Pepsi's at lunch today.  States he has history of hiatal hernia repair 2 years ago and has had no other issues since the surgery.  States he feels like all of his pain is in his upper abdomen and feels like he is full.  Has not been able to belch or pass gas.  Denies any vomiting or diarrhea.  Denies any fevers.      PAST MEDICAL HISTORY  Past Medical History:   Diagnosis Date    Esophageal hernia     GERD (gastroesophageal reflux disease)     History of transfusion     2022 [facility]         PAST SURGICAL HISTORY  Past Surgical History:   Procedure Laterality Date    COLONOSCOPY N/A 2022    Procedure:  COLONOSCOPY;  Surgeon: Mónica Brambila MD;  Location:  [facility];  Service: Gastroenterology;  Laterality: N/A;    ENDOSCOPY N/A 7/9/2022    Procedure: ESOPHAGOGASTRODUODENOSCOPY with biopsy;  Surgeon: Mónica Brambila MD;  Location:  [facility];  Service: Gastroenterology;  Laterality: N/A;    ENDOSCOPY W/ PEG TUBE PLACEMENT N/A 12/13/2022    Procedure: ESOPHAGOGASTRODUODENOSCOPY WITH PERCUTANEOUS ENDOSCOPIC GASTROSTOMY TUBE INSERTION;  Surgeon: Faisal Juarez MD;  Location:  [facility];  Service: General;  Laterality: N/A;    ESOPHAGEAL MOTILITY STUDY N/A 9/8/2022    Procedure: MANOMETRY;  Surgeon: Faisal Juarez MD;  Location:  [facility];  Service: General;  Laterality: N/A;    PARAESOPHAGEAL HERNIA REPAIR N/A 12/13/2022    Procedure: PARAESOPHAGEAL HERNIA REPAIR WITH MESH LAPAROSCOPIC, NISSEN;  Surgeon: Faisal Juarez MD;  Location:  [facility];  Service: General;  Laterality: N/A;         FAMILY HISTORY  Family History   Problem Relation Age of Onset    Colon cancer Neg Hx          SOCIAL HISTORY  Social History     Socioeconomic History    Marital status:    Tobacco Use    Smoking status: Never    Smokeless tobacco: Never   Vaping Use    Vaping status: Never Used   Substance and Sexual Activity    Alcohol use: Not Currently    Drug use: No     Comment: social    Sexual activity: Defer         ALLERGIES  Patient has no known allergies.        REVIEW OF SYSTEMS  Review of Systems     All systems reviewed and negative except for those discussed in HPI.       PHYSICAL EXAM    I have reviewed the triage vital signs and nursing notes.    ED Triage Vitals [06/28/24 2238]   Temp Heart Rate Resp BP SpO2   97.5 °F (36.4 °C) 70 18 134/83 99 %      Temp src Heart Rate Source Patient Position BP Location FiO2 (%)   Oral Monitor Sitting Left arm --       Physical Exam    General:  Awake, alert, no acute distress  HEENT: Atraumatic, normocephalic, EOMI, PERRLA, mucous membranes  moist  NECK:  Supple, atraumatic  Cardiovascular:  Regular rate, regular rhythm, no murmurs, rubs, or gallops.  Extremities well perfused   Respiratory:  Regular rate, clear lungs to auscultation bilaterally.  No rhonchi, rales, wheezing  Abdominal:  Soft, nondistended, tenderness of epigastrium and left upper quadrant.  No guarding or rebound.  No palpable masses  Extremity:  No visible bony abnormalities in all 4 extremities.  Full range of motion of all extremities.  Skin:  Warm and dry.  No rashes  Neuro:  AAOx3, GCS 15. Cranial nerves 2-12 grossly intact.  No focal strength or sensation deficits.  Psych:  Mood and affect appropriate.        LAB RESULTS  Recent Results (from the past 24 hour(s))   Comprehensive Metabolic Panel    Collection Time: 06/28/24 10:49 PM    Specimen: Blood   Result Value Ref Range    Glucose 103 (H) 65 - 99 mg/dL    BUN 20 6 - 20 mg/dL    Creatinine 1.00 0.76 - 1.27 mg/dL    Sodium 140 136 - 145 mmol/L    Potassium 4.0 3.5 - 5.2 mmol/L    Chloride 106 98 - 107 mmol/L    CO2 24.5 22.0 - 29.0 mmol/L    Calcium 8.9 8.6 - 10.5 mg/dL    Total Protein 7.1 6.0 - 8.5 g/dL    Albumin 4.3 3.5 - 5.2 g/dL    ALT (SGPT) 9 1 - 41 U/L    AST (SGOT) 21 1 - 40 U/L    Alkaline Phosphatase 84 39 - 117 U/L    Total Bilirubin 0.5 0.0 - 1.2 mg/dL    Globulin 2.8 gm/dL    A/G Ratio 1.5 g/dL    BUN/Creatinine Ratio 20.0 7.0 - 25.0    Anion Gap 9.5 5.0 - 15.0 mmol/L    eGFR 87.2 >60.0 mL/min/1.73   Lipase    Collection Time: 06/28/24 10:49 PM    Specimen: Blood   Result Value Ref Range    Lipase 14 13 - 60 U/L   Lactic Acid, Plasma    Collection Time: 06/28/24 10:49 PM    Specimen: Blood   Result Value Ref Range    Lactate 0.6 0.5 - 2.0 mmol/L   Green Top (Gel)    Collection Time: 06/28/24 10:49 PM   Result Value Ref Range    Extra Tube Hold for add-ons.    Lavender Top    Collection Time: 06/28/24 10:49 PM   Result Value Ref Range    Extra Tube hold for add-on    Gold Top - SST    Collection Time: 06/28/24  10:49 PM   Result Value Ref Range    Extra Tube Hold for add-ons.    Light Blue Top    Collection Time: 06/28/24 10:49 PM   Result Value Ref Range    Extra Tube Hold for add-ons.    CBC Auto Differential    Collection Time: 06/28/24 10:49 PM    Specimen: Blood   Result Value Ref Range    WBC 8.30 3.40 - 10.80 10*3/mm3    RBC 4.70 4.14 - 5.80 10*6/mm3    Hemoglobin 14.7 13.0 - 17.7 g/dL    Hematocrit 43.2 37.5 - 51.0 %    MCV 91.9 79.0 - 97.0 fL    MCH 31.3 26.6 - 33.0 pg    MCHC 34.0 31.5 - 35.7 g/dL    RDW 12.6 12.3 - 15.4 %    RDW-SD 42.5 37.0 - 54.0 fl    MPV 9.7 6.0 - 12.0 fL    Platelets 241 140 - 450 10*3/mm3    Neutrophil % 62.2 42.7 - 76.0 %    Lymphocyte % 25.5 19.6 - 45.3 %    Monocyte % 8.1 5.0 - 12.0 %    Eosinophil % 3.0 0.3 - 6.2 %    Basophil % 0.8 0.0 - 1.5 %    Immature Grans % 0.4 0.0 - 0.5 %    Neutrophils, Absolute 5.16 1.70 - 7.00 10*3/mm3    Lymphocytes, Absolute 2.12 0.70 - 3.10 10*3/mm3    Monocytes, Absolute 0.67 0.10 - 0.90 10*3/mm3    Eosinophils, Absolute 0.25 0.00 - 0.40 10*3/mm3    Basophils, Absolute 0.07 0.00 - 0.20 10*3/mm3    Immature Grans, Absolute 0.03 0.00 - 0.05 10*3/mm3    nRBC 0.0 0.0 - 0.2 /100 WBC   Urinalysis With Microscopic If Indicated (No Culture) - Urine, Clean Catch    Collection Time: 06/28/24 10:55 PM    Specimen: Urine, Clean Catch   Result Value Ref Range    Color, UA Yellow Yellow, Straw    Appearance, UA Clear Clear    pH, UA 6.0 5.0 - 8.0    Specific Gravity, UA 1.023 1.005 - 1.030    Glucose, UA Negative Negative    Ketones, UA Negative Negative    Bilirubin, UA Negative Negative    Blood, UA Negative Negative    Protein, UA Negative Negative    Leuk Esterase, UA Trace (A) Negative    Nitrite, UA Negative Negative    Urobilinogen, UA 0.2 E.U./dL 0.2 - 1.0 E.U./dL   Urinalysis, Microscopic Only - Urine, Clean Catch    Collection Time: 06/28/24 10:55 PM    Specimen: Urine, Clean Catch   Result Value Ref Range    RBC, UA None Seen None Seen, 0-2 /HPF    WBC, UA  3-5 (A) None Seen, 0-2 /HPF    Bacteria, UA 1+ (A) None Seen /HPF    Squamous Epithelial Cells, UA 0-2 None Seen, 0-2 /HPF    Hyaline Casts, UA None Seen None Seen /LPF    Methodology Manual Light Microscopy        If labs were ordered, I independently reviewed the results and considered them in treating the patient.        RADIOLOGY  CT Abdomen Pelvis With Contrast    Result Date: 6/28/2024  FINAL REPORT TECHNIQUE: Axial CT images were performed from the lung bases through the symphysis pubis after the administration of intravenous contrast.  This study was performed with techniques to keep radiation doses as low as reasonably achievable (ALARA). Individualized dose reduction techniques using automated exposure control or adjustment of mA and/or kV according to the patient's size were employed. CLINICAL HISTORY: Upper abdominal pain/ umbilical pain that began this evening. Pain worse on LT side  Hx. hiatal hernia repair 2 years ago FINDINGS: LOWER CHEST: The heart is normal size.  The lung bases are clear.  ABDOMEN/PELVIS:  Liver, gallbladder and bile ducts: The liver enhances homogeneously without suspicious focal hepatic lesion.  The gallbladder is unremarkable.  There is no definite biliary duct dilatation.  Adrenal glands: The adrenal glands are morphologically unremarkable without suspicious lesion. Kidneys, ureter and urinary bladder: No suspicious renal lesion. No hydronephrosis.  Urinary bladder is unremarkable.  Spleen: The spleen is normal size.  Pancreas: The pancreas is grossly unremarkable.  GI systems and mesentery: The patient status post gastric fundoplication.  There are multiple dilated gas and fluid-filled segments of small bowel.  Distal small bowel and colon are normal in caliber.  There appears to be a transition point in the left lower quadrant.  Image 78 series 2.  The appendix is visualized and unremarkable in appearance.  No significant mesenteric inflammation.  Lymph nodes: No definite  pathologically enlarged abdominal or pelvic lymph nodes present. Vessels: The aorta and abdominal arteries are grossly patent. The IVC and portal vein are patent and grossly unremarkable. Peritoneum: No free intraperitoneal fluid or pneumoperitoneum. Pelvic viscera: No acute findings.  Body wall: No body wall contusion. No significant body wall hernias.  Bones: No acute fracture.     Small bowel obstruction. Authenticated and Electronically Signed by Vicente Garcia MD on 06/28/2024 11:54:04 PM     I ordered and independently reviewed the above noted radiographic studies.     See radiologist's dictation for official interpretation.        PROCEDURES    Procedures    No orders to display       MEDICATIONS GIVEN IN ER    Medications   sodium chloride 0.9 % flush 10 mL (has no administration in time range)   sodium chloride 0.9 % bolus 500 mL (0 mL Intravenous Stopped 6/29/24 0003)   aluminum-magnesium hydroxide-simethicone (MAALOX MAX) 400-400-40 MG/5ML suspension 15 mL (15 mL Oral Given 6/28/24 2318)   Lidocaine Viscous HCl (XYLOCAINE) 2 % solution 5 mL (5 mL Mouth/Throat Given 6/28/24 2318)   iopamidol (ISOVUE-300) 61 % injection 100 mL (100 mL Intravenous Given 6/28/24 2311)   morphine injection 4 mg (4 mg Intravenous Given 6/29/24 0054)         MEDICAL DECISION MAKING, PROGRESS, and CONSULTS    All labs, if obtained, have been independently reviewed by me.  All radiology studies, if obtained, have been reviewed by me and the radiologist dictating the report.  All EKG's, if obtained, have been independently viewed and interpreted by me.      Discussion below represents my analysis of pertinent findings related to patient's condition, differential diagnosis, treatment plan and final disposition.    Franky Mccall is a 59 y.o. male who presents to the ED c/o abdominal pain.  Hemodynamically stable nontoxic in appearance upon arrival.  Differential includes was not limited to gastritis, gas pains, pancreatitis,  bowel obstruction, cholecystitis, cholelithiasis.  Labs ordered along with CT of abdomen pelvis with contrast.  Patient given IV fluid bolus of normal saline.  Patient also given GI cocktail viscous lidocaine and Maalox.      Following GI cocktail patient had no change in symptoms.  Labs show no significant leukocytosis.  Normal electrolyte panel, renal function panel and liver enzymes.  Normal lactate.  CT of abdomen pelvis obtained which shows evidence of small bowel obstruction with apparent transition point in the left lower quadrant.  Discussed results with patient who voiced understanding.  Patient given IV morphine for further pain control and NG tube was placed.  Consulted general surgeon on-call who is agreeable with this plan and will see patient as an inpatient.  Patient admitted for further treatment.                                 Orders placed during this visit:  Orders Placed This Encounter   Procedures    CT Abdomen Pelvis With Contrast    [place] Draw    Comprehensive Metabolic Panel    Lipase    Urinalysis With Microscopic If Indicated (No Culture) - Urine, Clean Catch    Lactic Acid, Plasma    CBC Auto Differential    Urinalysis, Microscopic Only - Urine, Clean Catch    NPO Diet NPO Type: Strict NPO    Undress & Gown    Nasogastric tube insertion    Code Status and Medical Interventions:    Inpatient General Surgery Consult    Insert Peripheral IV    Inpatient Admission    CBC & Differential    Green Top (Gel)    Lavender Top    Gold Top - SST    Light Blue Top         ED Course:    Consultants:                  Shared Decision Making:  After my consideration of clinical presentation and any laboratory/radiology studies obtained, I discussed the findings with the patient/patient representative who is in agreement with the treatment plan and the final disposition.   Risks and benefits of discharge and/or observation/admission were discussed.      AS OF 00:59 EDT VITALS:    BP - 134/83  HR -  70  TEMP - 97.5 °F (36.4 °C) (Oral)  O2 SATS - 99%                  DIAGNOSIS  Final diagnoses:   Small bowel obstruction   Epigastric pain         DISPOSITION  Admit      Please note that portions of this document were completed with voice recognition software.        Merritt De Santiago MD  06/29/24 0059      Electronically signed by Merritt De Santiago MD at 06/29/24 0059       Vital Signs (last day) before discharge       Date/Time Temp Temp src Pulse Resp BP Patient Position SpO2    06/29/24 1530 -- -- -- -- 108/91 -- 98    06/29/24 1500 -- -- -- -- 126/81 -- 97    06/29/24 1430 -- -- -- -- 128/89 -- 98    06/29/24 1400 -- -- -- -- 137/87 -- 97    06/29/24 1330 -- -- -- -- 128/94 -- 98    06/29/24 1300 -- -- -- -- 124/83 -- 98    06/29/24 1230 -- -- -- -- 128/86 -- 97    06/29/24 1200 -- -- -- -- 136/93 -- 97    06/29/24 1130 -- -- -- -- 126/84 -- 97    06/29/24 1100 -- -- -- -- 134/84 -- 97    06/29/24 1030 -- -- -- -- 129/85 -- 97    06/29/24 1000 -- -- -- -- 123/84 -- 97    06/29/24 0930 -- -- -- -- 136/87 -- 99    06/29/24 0900 -- -- -- -- 125/82 -- 97    06/29/24 0830 -- -- -- -- 124/82 -- 97    06/29/24 0800 -- -- -- -- 132/90 -- 98    06/29/24 0730 -- -- -- -- 127/85 -- 97    06/29/24 0700 -- -- -- -- 126/83 -- 97    06/29/24 0630 -- -- -- -- 125/85 -- 98    06/29/24 0600 -- -- 63 18 128/90 Sitting 98    06/29/24 0530 -- -- -- -- 138/85 -- 98    06/29/24 0500 -- -- 63 -- 133/85 -- 98    06/29/24 0400 -- -- 71 -- 131/84 -- 97    06/29/24 0330 -- -- 61 -- 122/83 -- 96    06/29/24 0300 -- -- -- -- 127/84 -- 97    06/29/24 0230 -- -- 67 -- 123/86 -- 97    06/29/24 0200 -- -- -- -- 125/83 -- 96    06/29/24 0130 -- -- -- -- 129/86 -- 98    06/28/24 2238 97.5 (36.4) Oral 70 18 134/83 Sitting 99          No current facility-administered medications for this encounter.     No current outpatient medications on file.     Lab Results (last 24 hours)       Procedure Component Value Units Date/Time    Basic Metabolic Panel  [[id number]]  (Abnormal) Collected: 06/29/24 0615    Specimen: Blood Updated: 06/29/24 0652     Glucose 103 mg/dL      BUN 18 mg/dL      Creatinine 0.83 mg/dL      Sodium 140 mmol/L      Potassium 4.2 mmol/L      Chloride 107 mmol/L      CO2 24.5 mmol/L      Calcium 8.5 mg/dL      BUN/Creatinine Ratio 21.7     Anion Gap 8.5 mmol/L      eGFR 101.4 mL/min/1.73     Narrative:      GFR Normal >60  Chronic Kidney Disease <60  Kidney Failure <15      CBC Auto Differential [[id number]]  (Abnormal) Collected: 06/29/24 0615    Specimen: Blood Updated: 06/29/24 0633     WBC 8.39 10*3/mm3      RBC 4.74 10*6/mm3      Hemoglobin 14.5 g/dL      Hematocrit 44.4 %      MCV 93.7 fL      MCH 30.6 pg      MCHC 32.7 g/dL      RDW 12.5 %      RDW-SD 43.6 fl      MPV 9.4 fL      Platelets 195 10*3/mm3      Neutrophil % 80.5 %      Lymphocyte % 11.8 %      Monocyte % 6.3 %      Eosinophil % 0.7 %      Basophil % 0.5 %      Immature Grans % 0.2 %      Neutrophils, Absolute 6.75 10*3/mm3      Lymphocytes, Absolute 0.99 10*3/mm3      Monocytes, Absolute 0.53 10*3/mm3      Eosinophils, Absolute 0.06 10*3/mm3      Basophils, Absolute 0.04 10*3/mm3      Immature Grans, Absolute 0.02 10*3/mm3      nRBC 0.0 /100 WBC     Comprehensive Metabolic Panel [[id number]]  (Abnormal) Collected: 06/28/24 2249    Specimen: Blood Updated: 06/28/24 2315     Glucose 103 mg/dL      BUN 20 mg/dL      Creatinine 1.00 mg/dL      Sodium 140 mmol/L      Potassium 4.0 mmol/L      Comment: Slight hemolysis detected by analyzer. Result may be falsely elevated.        Chloride 106 mmol/L      CO2 24.5 mmol/L      Calcium 8.9 mg/dL      Total Protein 7.1 g/dL      Albumin 4.3 g/dL      ALT (SGPT) 9 U/L      AST (SGOT) 21 U/L      Alkaline Phosphatase 84 U/L      Total Bilirubin 0.5 mg/dL      Globulin 2.8 gm/dL      A/G Ratio 1.5 g/dL      BUN/Creatinine Ratio 20.0     Anion Gap 9.5 mmol/L      eGFR 87.2 mL/min/1.73     Narrative:      GFR Normal >60  Chronic Kidney Disease  <60  Kidney Failure <15      Lipase [[id number]]  (Normal) Collected: 06/28/24 2249    Specimen: Blood Updated: 06/28/24 2315     Lipase 14 U/L     Lactic Acid, Plasma [[id number]]  (Normal) Collected: 06/28/24 2249    Specimen: Blood Updated: 06/28/24 2308     Lactate 0.6 mmol/L     Urinalysis, Microscopic Only - Urine, Clean Catch [[id number]]  (Abnormal) Collected: 06/28/24 2255    Specimen: Urine, Clean Catch Updated: 06/28/24 2306     RBC, UA None Seen /HPF      WBC, UA 3-5 /HPF      Bacteria, UA 1+ /HPF      Squamous Epithelial Cells, UA 0-2 /HPF      Hyaline Casts, UA None Seen /LPF      Methodology Manual Light Microscopy    Urinalysis With Microscopic If Indicated (No Culture) - Urine, Clean Catch [[id number]]  (Abnormal) Collected: 06/28/24 2255    Specimen: Urine, Clean Catch Updated: 06/28/24 2301     Color, UA Yellow     Appearance, UA Clear     pH, UA 6.0     Specific Gravity, UA 1.023     Glucose, UA Negative     Ketones, UA Negative     Bilirubin, UA Negative     Blood, UA Negative     Protein, UA Negative     Leuk Esterase, UA Trace     Nitrite, UA Negative     Urobilinogen, UA 0.2 E.U./dL    Golden Draw [[id number]] Collected: 06/28/24 2249    Specimen: Blood Updated: 06/28/24 2300    Narrative:      The following orders were created for panel order Golden Draw.  Procedure                               Abnormality         Status                     ---------                               -----------         ------                     Green Top (Gel)[[id number]]                                  Final result               Lavender Top[[id number]]                                     Final result               Gold Top - SST[[id number]]                                   Final result               Light Blue Top[[id number]]                                   Final result                 Please view results for these tests on the individual orders.    Green Top (Gel) [[id number]] Collected: 06/28/24 2249     Specimen: Blood Updated: 06/28/24 2300     Extra Tube Hold for add-ons.     Comment: Auto resulted.       Lavender Top [[id number]] Collected: 06/28/24 2249    Specimen: Blood Updated: 06/28/24 2300     Extra Tube hold for add-on     Comment: Auto resulted       Light Blue Top [[id number]] Collected: 06/28/24 2249    Specimen: Blood Updated: 06/28/24 2300     Extra Tube Hold for add-ons.     Comment: Auto resulted       Gold Top - SST [[id number]] Collected: 06/28/24 2249    Specimen: Blood Updated: 06/28/24 2300     Extra Tube Hold for add-ons.     Comment: Auto resulted.       CBC & Differential [[id number]]  (Normal) Collected: 06/28/24 2249    Specimen: Blood Updated: 06/28/24 2255    Narrative:      The following orders were created for panel order CBC & Differential.  Procedure                               Abnormality         Status                     ---------                               -----------         ------                     CBC Auto Differential[[id number]]        Normal              Final result                 Please view results for these tests on the individual orders.    CBC Auto Differential [[id number]]  (Normal) Collected: 06/28/24 2249    Specimen: Blood Updated: 06/28/24 2255     WBC 8.30 10*3/mm3      RBC 4.70 10*6/mm3      Hemoglobin 14.7 g/dL      Hematocrit 43.2 %      MCV 91.9 fL      MCH 31.3 pg      MCHC 34.0 g/dL      RDW 12.6 %      RDW-SD 42.5 fl      MPV 9.7 fL      Platelets 241 10*3/mm3      Neutrophil % 62.2 %      Lymphocyte % 25.5 %      Monocyte % 8.1 %      Eosinophil % 3.0 %      Basophil % 0.8 %      Immature Grans % 0.4 %      Neutrophils, Absolute 5.16 10*3/mm3      Lymphocytes, Absolute 2.12 10*3/mm3      Monocytes, Absolute 0.67 10*3/mm3      Eosinophils, Absolute 0.25 10*3/mm3      Basophils, Absolute 0.07 10*3/mm3      Immature Grans, Absolute 0.03 10*3/mm3      nRBC 0.0 /100 WBC           Imaging Results (Last 24 Hours)       Procedure Component Value Units  Date/Time    XR Abdomen KUB [[id number]] Collected: 06/29/24 1447     Updated: 06/29/24 1450    Narrative:      PROCEDURE: XR ABDOMEN KUB-     INDICATION:  Reevaluate SBO; K56.609-Unspecified intestinal obstruction,  unspecified as to partial versus complete obstruction; R10.13-Epigastric  pain     COMPARISON: 12 hours prior.     FINDINGS:  A supine view of the abdomen was obtained.  The bowel gas  pattern is normal.  There are no pathologic calcifications.  No acute  osseous abnormality is identified. NG tube identified with tip in the  proximal stomach, similar to prior exam.       Impression:      NG tube in place with no small or large bowel gaseous  distention seen consistent with resolving/improving small bowel  obstruction...              This report was signed and finalized on 6/29/2024 2:48 PM by Iliana Eden MD.       XR Abdomen KUB [[id number]] Collected: 06/29/24 0145     Updated: 06/29/24 0146    Narrative:      FINAL REPORT    TECHNIQUE:  null    CLINICAL HISTORY:  NG tube verification    COMPARISON:  null    FINDINGS:  EXAM: 1 VIEW ABDOMEN.    Comparison: CT/SR - CT ABDOMEN PELVIS W CONTRAST - 06/28/2024 11:04 PM EDT    FINDINGS:    NG tube satisfactory tip over the gastric body level.    Again noted are dilated small bowel loops compatible with obstruction.    Visceral shadows obscured by bowel gas.    No acute osseous abnormalities are appreciated.      Impression:      IMPRESSION:    NG tube satisfactory.    Dilated small bowel loops compatible with obstruction.    Authenticated and Electronically Signed by Rhona Barnes MD  on 06/29/2024 01:45:12 AM    CT Abdomen Pelvis With Contrast [[id number]] Collected: 06/28/24 2349     Updated: 06/28/24 8012    Narrative:      FINAL REPORT    TECHNIQUE:  Axial CT images were performed from the lung bases through the  symphysis pubis after the administration of intravenous  contrast.  This study was performed with techniques to keep  radiation doses as low  as reasonably achievable (ALARA).  Individualized dose reduction techniques using automated  exposure control or adjustment of mA and/or kV according to the  patient's size were employed.    CLINICAL HISTORY:  Upper abdominal pain/ umbilical pain that began this evening.  Pain worse on LT side  Hx. hiatal hernia repair 2 years ago    FINDINGS:  LOWER CHEST: The heart is normal size.  The lung bases are  clear.  ABDOMEN/PELVIS:  Liver, gallbladder and bile ducts: The  liver enhances homogeneously without suspicious focal hepatic  lesion.  The gallbladder is unremarkable.  There is no definite  biliary duct dilatation.  Adrenal glands: The adrenal glands are  morphologically unremarkable without suspicious lesion.  Kidneys, ureter and urinary bladder: No suspicious renal lesion.  No hydronephrosis.  Urinary bladder is unremarkable.  Spleen:  The spleen is normal size.  Pancreas: The pancreas is grossly  unremarkable.  GI systems and mesentery: The patient status post  gastric fundoplication.  There are multiple dilated gas and  fluid-filled segments of small bowel.  Distal small bowel and  colon are normal in caliber.  There appears to be a transition  point in the left lower quadrant.  Image 78 series 2.  The  appendix is visualized and unremarkable in appearance.  No  significant mesenteric inflammation.  Lymph nodes: No definite  pathologically enlarged abdominal or pelvic lymph nodes present.  Vessels: The aorta and abdominal arteries are grossly patent.  The IVC and portal vein are patent and grossly unremarkable.  Peritoneum: No free intraperitoneal fluid or pneumoperitoneum.  Pelvic viscera: No acute findings.  Body wall: No body wall  contusion. No significant body wall hernias.  Bones: No acute  fracture.      Impression:      Small bowel obstruction.    Authenticated and Electronically Signed by Vicente Garcia MD on  06/28/2024 11:54:04 PM          Physician Progress Notes (last 24 hours)  Notes from 06/30/24  08 through 24 0816   No notes of this type exist for this encounter.       Consult Notes (last 24 hours)  Notes from 24 0816 through 24 0816   No notes of this type exist for this encounter.          Discharge Summary        Jessica Mclaughlin DO at 24 1546              [facility]      Name:  Franky Mccall   Age:  59 y.o.  Sex:  male  :  1965  MRN:  [id number]   Visit Number:  [id number]    Admission Date:  2024  Date of Discharge:  2024  Primary Care Physician:  Abel Sweeney DO    Important issues to note:    Clear liquid diet upon discharge, discussed advance slowly as tolerated.  He can follow-up with Dr. Ashley or general surgery in [place] if he has any recurrence of symptoms or other issues  Follow-up with Dr. Sweeney otherwise.    Discharge Diagnoses:     Concern for partial SBO  History of paraesophageal hernia/gastric fundoplication  GERD    Problem List:     Active Hospital Problems    Diagnosis  POA    **SBO (small bowel obstruction) [K56.609]  Yes      Resolved Hospital Problems   No resolved problems to display.     Presenting Problem:    Chief Complaint   Patient presents with    Abdominal Pain      Consults:     Consulting Physician(s)         Provider   Role Specialty     Deanna Ashley MD      Consulting Physician General Surgery          Procedures Performed:        History of presenting illness/Hospital Course:    Patient is a pleasant 59-year-old with a history of a paraesophageal hernia status post prior repair, GERD, who had presented from home with complaints of abdominal pain.  Patient indicates that he had been eating at outlying restaurant, had drank a couple of diet Pepsi's for which he does not typically, it felt like he needed to have a bowel movement on the way home, but then noted distention of his abdomen and thus come to the emergency room for workup.  He denies any known  sick contacts.  No history of surgeries other than for paraesophageal hernia done by Dr. Juarez 2 years ago.    In the ER he was hemodynamically stable.  His labs and workup were largely unremarkable.  His CT scan abdomen pelvis was concerning for possible small bowel obstruction.  He was ordered an NG tube in the ER and was admitted to the hospitalist service for general surgery consultation.    At time of visit this morning, the patient was awake alert and oriented and had no significant complaints.  He notes he had been passing gas, he felt no abdominal pain now, was requesting to have NG tube removed and potentially go home if possible.  Dr. Ashley did evaluate the patient, had a repeat KUB, and showed resolving and improving bowel gas pattern.  Of note, the patient's NG tube was not hooked to suction at time of my visit or at time of Dr. Ashley's visit, unsure if received suction overnight.  Irregardless, he had no significant NG tube output this afternoon and subsequently it was removed.  He will be ordered a clear liquid diet and instructions to advance diet as tolerated upon discharge.    Patient can follow-up with general surgery/Dr. Ashley's office if has any recurrence of symptoms or issues moving forward, otherwise follow-up with PCP.    Vital Signs:    Temp:  [97.5 °F (36.4 °C)] 97.5 °F (36.4 °C)  Heart Rate:  [61-71] 63  Resp:  [18] 18  BP: (108-138)/(81-94) 108/91    Physical Exam:    General Appearance:  Alert and cooperative.  NAD   Head:  Atraumatic and normocephalic.   Eyes: Conjunctivae and sclerae normal, no icterus. No pallor.   Ears:  Ears with no abnormalities noted.   Throat: No oral lesions, no thrush, oral mucosa moist.   Neck: Supple, trachea midline, no thyromegaly.   Back:   No kyphoscoliosis present. No tenderness to palpation.   Lungs:   Breath sounds heard bilaterally equally.  No crackles or wheezing. No Pleural rub or bronchial breathing.   Heart:  Normal S1 and S2, no murmur, no  gallop, no rub. No JVD.   Abdomen:   Normal bowel sounds, no masses, no organomegaly. Soft, nontender, nondistended, no rebound tenderness.   Extremities: Supple, no edema, no cyanosis, no clubbing.   Pulses: Pulses palpable bilaterally.   Skin: No bleeding or rash.   Neurologic: Alert and oriented x 3. No facial asymmetry. Moves all four limbs. No tremors.     Pertinent Lab Results:     Results from last 7 days   Lab Units 06/29/24  0615 06/28/24  2249   SODIUM mmol/L 140 140   POTASSIUM mmol/L 4.2 4.0   CHLORIDE mmol/L 107 106   CO2 mmol/L 24.5 24.5   BUN mg/dL 18 20   CREATININE mg/dL 0.83 1.00   CALCIUM mg/dL 8.5* 8.9   BILIRUBIN mg/dL  --  0.5   ALK PHOS U/L  --  84   ALT (SGPT) U/L  --  9   AST (SGOT) U/L  --  21   GLUCOSE mg/dL 103* 103*     Results from last 7 days   Lab Units 06/29/24  0615 06/28/24  2249   WBC 10*3/mm3 8.39 8.30   HEMOGLOBIN g/dL 14.5 14.7   HEMATOCRIT % 44.4 43.2   PLATELETS 10*3/mm3 195 241                     Results from last 7 days   Lab Units 06/28/24  2249   LIPASE U/L 14               Pertinent Radiology Results:    Imaging Results (All)       Procedure Component Value Units Date/Time    XR Abdomen KUB [[id number]] Collected: 06/29/24 1447     Updated: 06/29/24 1450    Narrative:      PROCEDURE: XR ABDOMEN KUB-     INDICATION:  Reevaluate SBO; K56.609-Unspecified intestinal obstruction,  unspecified as to partial versus complete obstruction; R10.13-Epigastric  pain     COMPARISON: 12 hours prior.     FINDINGS:  A supine view of the abdomen was obtained.  The bowel gas  pattern is normal.  There are no pathologic calcifications.  No acute  osseous abnormality is identified. NG tube identified with tip in the  proximal stomach, similar to prior exam.       Impression:      NG tube in place with no small or large bowel gaseous  distention seen consistent with resolving/improving small bowel  obstruction...              This report was signed and finalized on 6/29/2024 2:48 PM by  Iliana Eden MD.       XR Abdomen KUB [[id number]] Collected: 06/29/24 0145     Updated: 06/29/24 0146    Narrative:      FINAL REPORT    TECHNIQUE:  null    CLINICAL HISTORY:  NG tube verification    COMPARISON:  null    FINDINGS:  EXAM: 1 VIEW ABDOMEN.    Comparison: CT/SR - CT ABDOMEN PELVIS W CONTRAST - 06/28/2024 11:04 PM EDT    FINDINGS:    NG tube satisfactory tip over the gastric body level.    Again noted are dilated small bowel loops compatible with obstruction.    Visceral shadows obscured by bowel gas.    No acute osseous abnormalities are appreciated.      Impression:      IMPRESSION:    NG tube satisfactory.    Dilated small bowel loops compatible with obstruction.    Authenticated and Electronically Signed by Rhona Barnes MD  on 06/29/2024 01:45:12 AM    CT Abdomen Pelvis With Contrast [[id number]] Collected: 06/28/24 2349     Updated: 06/28/24 2355    Narrative:      FINAL REPORT    TECHNIQUE:  Axial CT images were performed from the lung bases through the  symphysis pubis after the administration of intravenous  contrast.  This study was performed with techniques to keep  radiation doses as low as reasonably achievable (ALARA).  Individualized dose reduction techniques using automated  exposure control or adjustment of mA and/or kV according to the  patient's size were employed.    CLINICAL HISTORY:  Upper abdominal pain/ umbilical pain that began this evening.  Pain worse on LT side  Hx. hiatal hernia repair 2 years ago    FINDINGS:  LOWER CHEST: The heart is normal size.  The lung bases are  clear.  ABDOMEN/PELVIS:  Liver, gallbladder and bile ducts: The  liver enhances homogeneously without suspicious focal hepatic  lesion.  The gallbladder is unremarkable.  There is no definite  biliary duct dilatation.  Adrenal glands: The adrenal glands are  morphologically unremarkable without suspicious lesion.  Kidneys, ureter and urinary bladder: No suspicious renal lesion.  No hydronephrosis.  Urinary  bladder is unremarkable.  Spleen:  The spleen is normal size.  Pancreas: The pancreas is grossly  unremarkable.  GI systems and mesentery: The patient status post  gastric fundoplication.  There are multiple dilated gas and  fluid-filled segments of small bowel.  Distal small bowel and  colon are normal in caliber.  There appears to be a transition  point in the left lower quadrant.  Image 78 series 2.  The  appendix is visualized and unremarkable in appearance.  No  significant mesenteric inflammation.  Lymph nodes: No definite  pathologically enlarged abdominal or pelvic lymph nodes present.  Vessels: The aorta and abdominal arteries are grossly patent.  The IVC and portal vein are patent and grossly unremarkable.  Peritoneum: No free intraperitoneal fluid or pneumoperitoneum.  Pelvic viscera: No acute findings.  Body wall: No body wall  contusion. No significant body wall hernias.  Bones: No acute  fracture.      Impression:      Small bowel obstruction.    Authenticated and Electronically Signed by Vicente Garcia MD on  06/28/2024 11:54:04 PM            Echo:      Condition on Discharge:      Stable.    Code status during the hospital stay:    Code Status and Medical Interventions:   Ordered at: 06/29/24 0049     Code Status (Patient has no pulse and is not breathing):    CPR (Attempt to Resuscitate)     Medical Interventions (Patient has pulse or is breathing):    Full Support     Discharge Disposition:    Home or Self Care    Discharge Medications:       Discharge Medications        Stop These Medications      Docu 50 MG/5ML syrup  Generic drug: Docusate Sodium     HYDROcodone-acetaminophen 7.5-325 MG/15ML solution  Commonly known as: HYCET     ondansetron 4 MG tablet  Commonly known as: ZOFRAN     pantoprazole 40 MG EC tablet  Commonly known as: Protonix     simethicone 80 MG chewable tablet  Commonly known as: MYLICON            Discharge Diet:     Diet Instructions       Diet: Liquid Diets; Clear Liquid;  Thin (IDDSI 0)      Discharge Diet: Liquid Diets    Liquid Diet: Clear Liquid    Fluid Consistency: Thin (IDDSI 0)          Activity at Discharge:       Follow-up Appointments:     Follow-up Information       Abel Sweeney DO .    Specialty: Family Medicine  Contact information:  [address] [facility]  4th [address]  [facility] [address]  [phone]               Deanna Ashley MD. Call.    Specialty: General Surgery  Why: As needed, If symptoms worsen  Contact information:  [address] [id number] [phone]                           Future Appointments   Date Time Provider Department Center   8/8/2024  8:30 AM Madi Dominguez MD [facility]     Test Results Pending at Discharge:           Jessica Mclaughlin DO  06/29/24  16:54 EDT    Time: I spent 17 minutes on this discharge activity which included: face-to-face encounter with the patient, reviewing the data in the system, coordination of the care with the nursing staff as well as consultants, documentation, and entering orders.     Dictated utilizing Dragon dictation.      Electronically signed by Jessica Mclaughlin DO at 06/29/24 9595

## 2024-08-08 ENCOUNTER — PROCEDURE VISIT (OUTPATIENT)
Dept: UROLOGY | Facility: CLINIC | Age: 59
End: 2024-08-08
Payer: COMMERCIAL

## 2024-08-08 ENCOUNTER — LAB (OUTPATIENT)
Dept: LAB | Facility: HOSPITAL | Age: 59
End: 2024-08-08
Payer: COMMERCIAL

## 2024-08-08 VITALS — BODY MASS INDEX: 26.6 KG/M2 | WEIGHT: 190 LBS | HEIGHT: 71 IN

## 2024-08-08 DIAGNOSIS — R35.1 NOCTURIA: ICD-10-CM

## 2024-08-08 DIAGNOSIS — N43.3 HYDROCELE, UNSPECIFIED HYDROCELE TYPE: Primary | ICD-10-CM

## 2024-08-08 LAB — PSA SERPL-MCNC: 1.5 NG/ML (ref 0–4)

## 2024-08-08 PROCEDURE — 84153 ASSAY OF PSA TOTAL: CPT

## 2024-08-08 PROCEDURE — 36415 COLL VENOUS BLD VENIPUNCTURE: CPT

## 2024-08-08 NOTE — PROGRESS NOTES
Preoperative diagnosis  Hydrocele    Postoperative diagnosis  Same    Procedure performed  Hydrocele drainage with ultrasound guidance    Surgeon  Madi Dominguez MD    Anesthesia  5 mL lidocaine 2% local injection    Complications  None    EBL  2 mL    Specimen  none    Indications  59 y.o. male agreed to undergo the above named procedure after discussion of the alternatives, risks and benefits.  Informed consent was obtained.      Lab Results   Component Value Date    PSA 1.570 03/13/2023    PSA 2.270 04/14/2021    PSA 1.880 08/15/2019     Procedure  The patient was brought to the procedure room and placed in supine position.  His scrotum was prepped with Hibiclens and he was draped in a sterile fashion.  A timeout was performed.  He was given oral antibiotic for prophylaxis.    We began the procedure with ultrasound identification of the hydrocele area.  A small amount of local anesthetic was instilled of the anterior scrotum and a wheal.  We then placed an 18-gauge needle through it and into the hydrocele sac under ultrasound guidance.  The entirety of the hydrocele was then drained.  Total volume drained was 205 cc.  There was minimal bleeding.  The patient tolerated the procedure well.

## 2025-02-10 ENCOUNTER — PROCEDURE VISIT (OUTPATIENT)
Dept: UROLOGY | Facility: CLINIC | Age: 60
End: 2025-02-10
Payer: COMMERCIAL

## 2025-02-10 DIAGNOSIS — N43.3 HYDROCELE, UNSPECIFIED HYDROCELE TYPE: Primary | ICD-10-CM

## 2025-02-10 PROCEDURE — 55000 DRAINAGE OF HYDROCELE: CPT | Performed by: UROLOGY

## 2025-02-10 PROCEDURE — 76942 ECHO GUIDE FOR BIOPSY: CPT | Performed by: UROLOGY

## 2025-02-10 NOTE — PROGRESS NOTES
Preoperative diagnosis  Right hydrocele    Postoperative diagnosis  Same    Procedure performed  Right hydrocele drainage with ultrasound guidance    Surgeon  Mdai Dominguez MD    Anesthesia  5 mL lidocaine 2% local injection    Complications  None    EBL  2 mL    Specimen  none    Indications  59 y.o. male agreed to undergo the above named procedure after discussion of the alternatives, risks and benefits.  Informed consent was obtained.      Lab Results   Component Value Date    PSA 1.500 08/08/2024    PSA 1.570 03/13/2023    PSA 2.270 04/14/2021     Procedure  The patient was brought to the procedure room and placed in supine position.  His scrotum was prepped with Hibiclens and he was draped in a sterile fashion.  A timeout was performed.  He was given oral antibiotic for prophylaxis.    We began the procedure with ultrasound identification of the hydrocele area.  A small amount of local anesthetic was instilled of the right anterior scrotum and a wheal.  We then placed an 18-gauge needle through it and into the hydrocele sac under ultrasound guidance.  The entirety of the hydrocele was then drained.  Total volume drained was 205 cc.  There was minimal bleeding.  The patient tolerated the procedure well.

## 2025-05-22 NOTE — ANESTHESIA PREPROCEDURE EVALUATION
Anesthesia Evaluation     Patient summary reviewed and Nursing notes reviewed   NPO Solid Status: > 8 hours  NPO Liquid Status: > 8 hours           Airway   Mallampati: II  TM distance: >3 FB  Neck ROM: full  Possible difficult intubation  Dental - normal exam     Pulmonary - normal exam   Cardiovascular - normal exam        Neuro/Psych  GI/Hepatic/Renal/Endo    (+)  GERD well controlled,      Musculoskeletal     Abdominal  - normal exam   Substance History      OB/GYN          Other                        Anesthesia Plan    ASA 2 - emergent     MAC     intravenous induction     Anesthetic plan, risks, benefits, and alternatives have been provided, discussed and informed consent has been obtained with: patient.        CODE STATUS:    Level Of Support Discussed With: Patient  Code Status (Patient has no pulse and is not breathing): CPR (Attempt to Resuscitate)  Medical Interventions (Patient has pulse or is breathing): Full Support       5/6/2025

## 2025-08-11 ENCOUNTER — OFFICE VISIT (OUTPATIENT)
Dept: UROLOGY | Facility: CLINIC | Age: 60
End: 2025-08-11
Payer: COMMERCIAL

## 2025-08-11 DIAGNOSIS — N43.3 HYDROCELE, UNSPECIFIED HYDROCELE TYPE: Primary | ICD-10-CM

## 2025-08-11 PROCEDURE — 76942 ECHO GUIDE FOR BIOPSY: CPT | Performed by: UROLOGY

## 2025-08-11 PROCEDURE — 55000 DRAINAGE OF HYDROCELE: CPT | Performed by: UROLOGY

## (undated) DEVICE — INTRO ACCSR BLNT TP

## (undated) DEVICE — SYRINGE, LUER LOCK, 5ML: Brand: MEDLINE

## (undated) DEVICE — DRESSING,OPTIFOAM,NON-ADHESIVE,4X4: Brand: MEDLINE

## (undated) DEVICE — APPL CHLORAPREP TINTED 26ML TEAL

## (undated) DEVICE — ENDOPATH XCEL UNIVERSAL TROCAR STABLILITY SLEEVES: Brand: ENDOPATH XCEL

## (undated) DEVICE — ENDOPATH XCEL BLADELESS TROCARS WITH STABILITY SLEEVES: Brand: ENDOPATH XCEL

## (undated) DEVICE — NDL HYPO ECLPS SFTY 25G 1 1/2IN

## (undated) DEVICE — MARYLAND JAW LAPAROSCOPIC SEALER/DIVIDER COATED: Brand: LIGASURE

## (undated) DEVICE — Device

## (undated) DEVICE — CONMED SCOPE SAVER BITE BLOCK, 20X27 MM: Brand: SCOPE SAVER

## (undated) DEVICE — ENDOSCOPY PORT CONNECTOR FOR OLYMPUS® SCOPES: Brand: ERBE

## (undated) DEVICE — LUBE JELLY PK/2.75GM STRL BX/144

## (undated) DEVICE — PK SOL VISC ESOPH 80ML

## (undated) DEVICE — [HIGH FLOW INSUFFLATOR,  DO NOT USE IF PACKAGE IS DAMAGED,  KEEP DRY,  KEEP AWAY FROM SUNLIGHT,  PROTECT FROM HEAT AND RADIOACTIVE SOURCES.]: Brand: PNEUMOSURE

## (undated) DEVICE — "MH-438 A/W VLVE F/140 EVIS-140": Brand: AIR/WATER VALVE

## (undated) DEVICE — KT BIOGUARD SXN VLV AIR/H20 4PC DISP

## (undated) DEVICE — ENDOCUT SCISSOR TIP, DISPOSABLE: Brand: RENEW

## (undated) DEVICE — HYBRID TUBING/CAP SET FOR OLYMPUS® SCOPES: Brand: ERBE

## (undated) DEVICE — PERCUTANEOUS ENDOSCOPIC GASTROSTOMY KIT: Brand: ENDOVIVE STANDARD PEG KIT

## (undated) DEVICE — PDS II VLT 0 107CM AG ST3: Brand: ENDOLOOP

## (undated) DEVICE — LAPAROVUE VISIBILITY SYSTEM LAPAROSCOPIC SOLUTIONS: Brand: LAPAROVUE

## (undated) DEVICE — "MH-443 SUCTION VALVE F/EVIS140 EVIS160": Brand: SUCTION VALVE

## (undated) DEVICE — LAPAROSCOPIC SMOKE FILTRATION SYSTEM: Brand: PALL LAPAROSHIELD® PLUS LAPAROSCOPIC SMOKE FILTRATION SYSTEM

## (undated) DEVICE — INTENDED USE FOR SURGICAL MARKING ON INTACT SKIN, ALSO PROVIDES A PERMANENT METHOD OF IDENTIFYING OBJECTS IN THE OPERATING ROOM: Brand: WRITESITE® REGULAR TIP SKIN MARKER

## (undated) DEVICE — LIMB HOLDER, WRIST/ANKLE: Brand: DEROYAL

## (undated) DEVICE — INCISIONLINE PLEDGET SFT 22X1 2.75MM

## (undated) DEVICE — DRAINBAG,ANTI-REFLUX TOWER,L/F,2000ML,LL: Brand: MEDLINE

## (undated) DEVICE — GLV SURG SENSICARE PI MIC PF SZ7 LF STRL

## (undated) DEVICE — SYR LUERLOK 50ML

## (undated) DEVICE — GLV SURG SENSICARE PI MIC PF SZ7.5 LF STRL

## (undated) DEVICE — VLV SXN AIR/H2O ORCAPOD3 1P/U STRL

## (undated) DEVICE — PENROSE DRAIN 18 X .5" SILICONE: Brand: MEDLINE

## (undated) DEVICE — SUT SILK 2/0 SH 30IN K833H

## (undated) DEVICE — PK LAP LASR CHOLE 10

## (undated) DEVICE — ANTIBACTERIAL UNDYED BRAIDED (POLYGLACTIN 910), SYNTHETIC ABSORBABLE SUTURE: Brand: COATED VICRYL

## (undated) DEVICE — SUCTION CANISTER, 1500CC, RIGID: Brand: DEROYAL

## (undated) DEVICE — TUBING, SUCTION, 1/4" X 10', STRAIGHT: Brand: MEDLINE

## (undated) DEVICE — Device: Brand: AIR/WATER CHANNEL CLEANING ADAPTER

## (undated) DEVICE — JELLY,LUBE,STERILE,FLIP TOP,TUBE,2-OZ: Brand: MEDLINE

## (undated) DEVICE — THE BITE BLOCK MAXI, LATEX FREE STRAP IS USED TO PROTECT THE ENDOSCOPE INSERTION TUBE FROM BEING BITTEN BY THE PATIENT.

## (undated) DEVICE — PATIENT RETURN ELECTRODE, SINGLE-USE, CONTACT QUALITY MONITORING, ADULT, WITH 9FT CORD, FOR PATIENTS WEIGING OVER 33LBS. (15KG): Brand: MEGADYNE

## (undated) DEVICE — GOWN,PREVENTION PLUS,XXLARGE,STERILE: Brand: MEDLINE

## (undated) DEVICE — FRCP BX RADJAW4 NDL 2.8 240 STD OG

## (undated) DEVICE — SUT SILK 0 SH 30IN K834H